# Patient Record
Sex: FEMALE | Race: WHITE | NOT HISPANIC OR LATINO | Employment: FULL TIME | URBAN - NONMETROPOLITAN AREA
[De-identification: names, ages, dates, MRNs, and addresses within clinical notes are randomized per-mention and may not be internally consistent; named-entity substitution may affect disease eponyms.]

---

## 2017-01-30 ENCOUNTER — COMMUNICATION - GICH (OUTPATIENT)
Dept: FAMILY MEDICINE | Facility: OTHER | Age: 57
End: 2017-01-30

## 2017-01-30 DIAGNOSIS — Z00.00 ENCOUNTER FOR GENERAL ADULT MEDICAL EXAMINATION WITHOUT ABNORMAL FINDINGS: ICD-10-CM

## 2017-02-16 ENCOUNTER — HISTORY (OUTPATIENT)
Dept: RADIOLOGY | Facility: OTHER | Age: 57
End: 2017-02-16

## 2017-02-16 ENCOUNTER — HOSPITAL ENCOUNTER (OUTPATIENT)
Dept: RADIOLOGY | Facility: OTHER | Age: 57
End: 2017-02-16
Attending: FAMILY MEDICINE

## 2017-02-16 DIAGNOSIS — Z12.31 ENCOUNTER FOR SCREENING MAMMOGRAM FOR MALIGNANT NEOPLASM OF BREAST: ICD-10-CM

## 2017-02-27 ENCOUNTER — OFFICE VISIT - GICH (OUTPATIENT)
Dept: FAMILY MEDICINE | Facility: OTHER | Age: 57
End: 2017-02-27

## 2017-02-27 ENCOUNTER — HISTORY (OUTPATIENT)
Dept: FAMILY MEDICINE | Facility: OTHER | Age: 57
End: 2017-02-27

## 2017-02-27 DIAGNOSIS — G43.809 OTHER MIGRAINE, NOT INTRACTABLE, WITHOUT STATUS MIGRAINOSUS: ICD-10-CM

## 2017-02-27 DIAGNOSIS — Z00.00 ENCOUNTER FOR GENERAL ADULT MEDICAL EXAMINATION WITHOUT ABNORMAL FINDINGS: ICD-10-CM

## 2017-03-03 ENCOUNTER — AMBULATORY - GICH (OUTPATIENT)
Dept: LAB | Facility: OTHER | Age: 57
End: 2017-03-03

## 2017-03-03 DIAGNOSIS — Z00.00 ENCOUNTER FOR GENERAL ADULT MEDICAL EXAMINATION WITHOUT ABNORMAL FINDINGS: ICD-10-CM

## 2017-03-03 LAB
A/G RATIO - HISTORICAL: 1.4 (ref 1–2)
ABSOLUTE BASOPHILS - HISTORICAL: 0.1 THOU/CU MM
ABSOLUTE EOSINOPHILS - HISTORICAL: 0.1 THOU/CU MM
ABSOLUTE LYMPHOCYTES - HISTORICAL: 2.6 THOU/CU MM (ref 0.9–2.9)
ABSOLUTE MONOCYTES - HISTORICAL: 0.6 THOU/CU MM
ABSOLUTE NEUTROPHILS - HISTORICAL: 2.7 THOU/CU MM (ref 1.7–7)
ALBUMIN SERPL-MCNC: 4.1 G/DL (ref 3.5–5.7)
ALP SERPL-CCNC: 55 IU/L (ref 34–104)
ALT (SGPT) - HISTORICAL: 26 IU/L (ref 7–52)
ANION GAP - HISTORICAL: 13 (ref 5–18)
AST SERPL-CCNC: 21 IU/L (ref 13–39)
BASOPHILS # BLD AUTO: 1.3 %
BILIRUB SERPL-MCNC: 0.6 MG/DL (ref 0.3–1)
BUN SERPL-MCNC: 17 MG/DL (ref 7–25)
BUN/CREAT RATIO - HISTORICAL: 16
CALCIUM SERPL-MCNC: 9.5 MG/DL (ref 8.6–10.3)
CHLORIDE SERPLBLD-SCNC: 101 MMOL/L (ref 98–107)
CHOL/HDL RATIO - HISTORICAL: 4.86
CHOLESTEROL TOTAL: 209 MG/DL
CO2 SERPL-SCNC: 27 MMOL/L (ref 21–31)
CREAT SERPL-MCNC: 1.09 MG/DL (ref 0.7–1.3)
EOSINOPHIL NFR BLD AUTO: 0.9 %
ERYTHROCYTE [DISTWIDTH] IN BLOOD BY AUTOMATED COUNT: 11.2 % (ref 11.5–15.5)
GFR IF NOT AFRICAN AMERICAN - HISTORICAL: 52 ML/MIN/1.73M2
GLOBULIN - HISTORICAL: 2.9 G/DL (ref 2–3.7)
GLUCOSE SERPL-MCNC: 98 MG/DL (ref 70–105)
HCT VFR BLD AUTO: 42.3 % (ref 33–51)
HDLC SERPL-MCNC: 43 MG/DL (ref 23–92)
HEMOGLOBIN: 14.7 G/DL (ref 12–16)
LDLC SERPL CALC-MCNC: 144 MG/DL
LYMPHOCYTES NFR BLD AUTO: 43.2 % (ref 20–44)
MCH RBC QN AUTO: 32.7 PG (ref 26–34)
MCHC RBC AUTO-ENTMCNC: 34.8 G/DL (ref 32–36)
MCV RBC AUTO: 94 FL (ref 80–100)
MONOCYTES NFR BLD AUTO: 9.4 %
NEUTROPHILS NFR BLD AUTO: 45.2 % (ref 42–72)
NON-HDL CHOLESTEROL - HISTORICAL: 166 MG/DL
PATIENT STATUS - HISTORICAL: ABNORMAL
PLATELET # BLD AUTO: 275 THOU/CU MM (ref 140–440)
PMV BLD: 5.9 FL (ref 6.5–11)
POTASSIUM SERPL-SCNC: 3.8 MMOL/L (ref 3.5–5.1)
PROT SERPL-MCNC: 7 G/DL (ref 6.4–8.9)
RED BLOOD COUNT - HISTORICAL: 4.5 MIL/CU MM (ref 4–5.2)
SODIUM SERPL-SCNC: 141 MMOL/L (ref 133–143)
TRIGL SERPL-MCNC: 109 MG/DL
TSH - HISTORICAL: 1.36 UIU/ML (ref 0.34–5.6)
WHITE BLOOD COUNT - HISTORICAL: 6 THOU/CU MM (ref 4.5–11)

## 2017-03-21 ENCOUNTER — HISTORY (OUTPATIENT)
Dept: FAMILY MEDICINE | Facility: OTHER | Age: 57
End: 2017-03-21

## 2017-03-21 ENCOUNTER — AMBULATORY - GICH (OUTPATIENT)
Dept: SCHEDULING | Facility: OTHER | Age: 57
End: 2017-03-21

## 2017-03-21 ENCOUNTER — AMBULATORY - GICH (OUTPATIENT)
Dept: FAMILY MEDICINE | Facility: OTHER | Age: 57
End: 2017-03-21

## 2017-03-21 DIAGNOSIS — Z01.818 ENCOUNTER FOR OTHER PREPROCEDURAL EXAMINATION: ICD-10-CM

## 2017-03-21 DIAGNOSIS — G43.809 OTHER MIGRAINE, NOT INTRACTABLE, WITHOUT STATUS MIGRAINOSUS: ICD-10-CM

## 2017-03-27 ENCOUNTER — AMBULATORY - GICH (OUTPATIENT)
Dept: SCHEDULING | Facility: OTHER | Age: 57
End: 2017-03-27

## 2017-05-02 ENCOUNTER — AMBULATORY - GICH (OUTPATIENT)
Dept: SCHEDULING | Facility: OTHER | Age: 57
End: 2017-05-02

## 2017-12-14 ENCOUNTER — OFFICE VISIT - GICH (OUTPATIENT)
Dept: FAMILY MEDICINE | Facility: OTHER | Age: 57
End: 2017-12-14

## 2017-12-14 ENCOUNTER — HISTORY (OUTPATIENT)
Dept: FAMILY MEDICINE | Facility: OTHER | Age: 57
End: 2017-12-14

## 2017-12-14 DIAGNOSIS — R39.9 UNSPECIFIED SYMPTOMS AND SIGNS INVOLVING THE GENITOURINARY SYSTEM: ICD-10-CM

## 2017-12-14 DIAGNOSIS — R31.9 HEMATURIA: ICD-10-CM

## 2017-12-14 DIAGNOSIS — R39.89 OTHER SYMPTOMS AND SIGNS INVOLVING THE GENITOURINARY SYSTEM: ICD-10-CM

## 2017-12-14 DIAGNOSIS — N39.0 URINARY TRACT INFECTION: ICD-10-CM

## 2017-12-14 LAB
BACTERIA URINE: ABNORMAL BACTERIA/HPF
BILIRUB UR QL: NEGATIVE
CLARITY, URINE: ABNORMAL CLARITY
COLOR UR: YELLOW COLOR
EPITHELIAL CELLS: ABNORMAL EPI/HPF
GLUCOSE URINE: NEGATIVE MG/DL
KETONES UR QL: NEGATIVE MG/DL
LEUKOCYTE ESTERASE URINE: ABNORMAL
NITRITE UR QL STRIP: POSITIVE
OCCULT BLOOD,URINE - HISTORICAL: ABNORMAL
PH UR: 5.5 [PH]
PROTEIN QUALITATIVE,URINE - HISTORICAL: 100 MG/DL
RBC - HISTORICAL: >100 /HPF
SP GR UR STRIP: >=1.03
UROBILINOGEN,QUALITATIVE - HISTORICAL: NORMAL EU/DL
WBC - HISTORICAL: >100 /HPF

## 2017-12-16 LAB
CULTURE - HISTORICAL: ABNORMAL
CULTURE - HISTORICAL: ABNORMAL
SUSCEPTIBILITY RESULT - HISTORICAL: ABNORMAL

## 2018-01-03 NOTE — TELEPHONE ENCOUNTER
Patient Information     Patient Name MRN Sex     Nicole Perera [id number] Female 1960      Telephone Encounter by Lillie Saldaña MD at 2017 12:28 PM     Author:  Lillie Saldaña MD Service:  (none) Author Type:  Physician     Filed:  2017 12:28 PM Encounter Date:  2017 Status:  Signed     :  Lillie Saldaña MD (Physician)            Orders for comprehensive metabolic profile, lipids, complete blood count and TSH placed.  Lillie Saldaña MD ....................  2017   12:28 PM

## 2018-01-03 NOTE — ADDENDUM NOTE
Patient Information     Patient Name MRN Sex     Nicole Perera [id number] Female 1960      Addendum Note by Lillie Saldaña MD at 2017 12:28 PM     Author:  Lillie Saldaña MD Service:  (none) Author Type:  Physician     Filed:  2017 12:28 PM Encounter Date:  2017 Status:  Signed     :  Lillie Saldaña MD (Physician)       Addended by: LILLIE SALDAÑA on: 2017 12:28 PM        Modules accepted: Orders

## 2018-01-03 NOTE — PROGRESS NOTES
Patient Information     Patient Name MRN Sex     Nicole Perera [id number] Female 1960      Progress Notes by Lillie Saldaña MD at 2017  1:30 PM     Author:  Lillie Saldaña MD Service:  (none) Author Type:  Physician     Filed:  2017  5:46 PM Encounter Date:  2017 Status:  Signed     :  Lillie Saldaña MD (Physician)            SUBJECTIVE:    Nicole Perera is a 56 y.o. female who presents for a physical.    Gets a headache about once a month now.  She takes inderal daily to prevent migraines and takes maxalt as an abortive agent.  She gets 2-3 other headaches a week.  She usually takes ibuprofen for her other headache, which usually works.  She does have more sinus congestion with it also, which is typical for her headaches.  She notices worsening with changes in barometric pressure.    HPI  I personally reviewed medications/allergies/history listed below:     Allergies     Allergen  Reactions     Topamax [Topiramate] Dizziness   ,   Family History       Problem   Relation Age of Onset     Heart Disease  Father 50     Stroke  Father      Other  Maternal Aunt      Migraines       Diabetes  Brother      Thyroid Disease  Mother      Thyroid disorder -after being stepped on by a cow       Diabetes  Mother      Other  Sister      CP       Alcohol/Drug  Brother 44      ETOHism-     ,   Current Outpatient Prescriptions on File Prior to Visit       Medication  Sig Dispense Refill     aspirin enteric coated 81 mg tablet Take 1 tablet by mouth once daily with a meal.  0     calcium carbonate-vitamin D3, 600 mg-400 unit, (CALCIUM WITH VITAMIN D) tablet Take 1 tablet by mouth 2 times daily with meals.  0     ibuprofen (ADVIL; MOTRIN) 200 mg tablet Take 3 tablets by mouth every 6 hours if needed for Pain.  0     multivitamin (MVI) tablet Take 1 tablet by mouth once daily.  0     omega-3 fatty acids-vitamin E (FISH OIL) 1,000 mg cap Take 1 capsule by mouth once daily.   0     No current facility-administered medications on file prior to visit.    ,   Past Medical History      Diagnosis   Date     Abnormal Pap smear       HX of abnormal Pap with atypical squamous colp  showed benign reactive epithelial changes, subsequent Paps have been normal      Exposure to bat without known bite       Bat bite, left side of neck      Hx of pregnancy             PAP SMEAR, ABNORMAL       Hx of abnormal Pap with atypical squamous colp  showed benign reactive  epithelial changes, subsequent Paps have been normal.     ,   Patient Active Problem List     Diagnosis  Code     FIBROCYSTIC BREAST DISEASE N60.19     MIGRAINE HEADACHE G43.909     Rosacea L71.9     Dyspareunia N94.1    and   Past Surgical History       Procedure   Laterality Date      section        Rt breast biopsy for fibrocystic breast disease~       Appendectomy        Breast biopsy        Esophagogastroduodenoscopy   07     Esophagogastroduodenoscopy       Colonoscopy screening   2010     Colonoscopy-  normal  Next due .       Social History     Social History        Marital status:       Spouse name: N/A     Number of children:  N/A     Years of education:  N/A     Occupational History      Not on file.     Social History Main Topics         Smoking status:  Never Smoker      Smokeless tobacco:  Never Used      Alcohol use  0.6 oz/week     1 Glasses of wine per week      Drug use:  No      Sexual activity:  Yes      Partners: Male      Birth control/ protection: Post-menopausal      Other Topics  Concern     Not on file      Social History Narrative     Does not smoke, guns at home are locked, minimal ETOH, does use sunscreen, uses seat belts 100% of the time.    .  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  Has a degree in health information management.    Arthur - son - attending [employer] in nursing.    Antonio -                 REVIEW OF SYSTEMS:  Review  "of Systems   All other systems reviewed and are negative.      OBJECTIVE:  /74  Temp 98.2  F (36.8  C) (Tympanic)  Ht 1.747 m (5' 8.8\")  Wt 102.7 kg (226 lb 6.4 oz)  Breastfeeding? No  BMI 33.63 kg/m2    EXAM:   Physical Exam   Constitutional: She is oriented to person, place, and time and well-developed, well-nourished, and in no distress.   HENT:   Head: Normocephalic.   Right Ear: External ear normal.   Left Ear: External ear normal.   Nose: Nose normal.   Mouth/Throat: Oropharynx is clear and moist.   Eyes: Pupils are equal, round, and reactive to light.   Neck: Normal range of motion. Neck supple. No thyromegaly present.   Cardiovascular: Normal rate, regular rhythm and normal heart sounds.    No murmur heard.  Pulmonary/Chest: Effort normal and breath sounds normal. No respiratory distress. She has no wheezes. She has no rales.   Breast exam: No masses palpable. No skin changes, tethering, axillary lymphadenopathy bilaterally.   Abdominal: Soft. Bowel sounds are normal. She exhibits no distension and no mass. There is no tenderness. There is no rebound and no guarding.   Genitourinary:   Genitourinary Comments: Pelvic/rectal exam deferred.   Musculoskeletal: Normal range of motion. She exhibits no edema.   Lymphadenopathy:     She has no cervical adenopathy.   Neurological: She is alert and oriented to person, place, and time. No cranial nerve deficit.   Skin: Skin is warm and dry.   Psychiatric: Affect normal.   PHQ Depression Screen  Date of PHQ exam: 02/27/17  Over the last 2 weeks, how often have you been bothered by any of the following problems?  1. Little interest or pleasure in doing things: 0 - Not at all  2. Feeling down, depressed, or hopeless: 0 - Not at all                                             ASSESSMENT/PLAN:    ICD-10-CM    1. Health care maintenance Z00.00    2. Other migraine without status migrainosus, not intractable G43.809 propranolol (INDERAL) 20 mg tablet      rizatriptan " (MAXALT-MLT) 10 mg disintegrating tablet        Plan:    1. Mammogram is up-to-date. Pap is up-to-date. Colonoscopy up-to-date. Vaccines are up-to-date. Orders for fasting labs were already placed. She is assisted in making an appointment for fasting lab appointment.  2. She feels these headaches are stable at this time. Maxalt and propranolol were refilled for her today.  Lillie Saldaña MD

## 2018-01-03 NOTE — TELEPHONE ENCOUNTER
Patient Information     Patient Name MRN Sex Nicole Zeng [id number] Female 1960      Telephone Encounter by Magdalena Chandler at 2017 11:35 AM     Author:  Magdalena Chandler Service:  (none) Author Type:  (none)     Filed:  2017 11:36 AM Encounter Date:  2017 Status:  Signed     :  Magdalena Chandler            [facility] PATIENT HAS AN APPOINTMENT 2017 FOR ANNUAL MED CHECK AND IS WANTING TO KNOW IF [facility] WOULD PUT IN AN ORDER FOR LAB WORK SO SHE COULD HAVE THAT DONE BEFORE THE APPOINTMENT. PLEASE CALL ONCE ORDER IS PLACED OR IF [facility] WOULD PREFER TO DO IT AT THE APPOINTMENT, THANKS.  Magdalena Chandler ....................  2017   11:36 AM

## 2018-01-03 NOTE — H&P
"Patient Information     Patient Name MRN Nicole Tomas [id number] Female 1960      H&P by Lillie Saldaña MD at 3/21/2017  9:30 AM     Author:  Lillie Saldaña MD Service:  (none) Author Type:  Physician     Filed:  3/21/2017 12:28 PM Encounter Date:  3/21/2017 Status:  Signed     :  Lillie Saldaña MD (Physician)            ----------------- PREOPERATIVE EXAM ------------------  3/21/2017    SUBJECTIVE:  Nicole Perera is a 56 y.o. female here for preoperative optimization.    I was asked to see Nicole Perera by Dr. Burton for  preoperative evaluation due to history of migraines    Date of Surgery: 3/27/17  Type of Surgery: Rectocele repair  Surgeon: [facility]:  Norman Rodriguez    HPI:  Has had issues with rectocele for several years. Having issues with needing to insert a finger vaginally to push posteriorly to defecate.  Gets constipated more easily as a result.      Fever/Chills or other infectious symptoms in past month:  (NO)   >10lb weight loss in past two months:  (NO)     Health Care Directive/Code status: Full Code - Has a Living Will  Hx of blood transfusions:   (NO)   History of VRE/MRSA:  (NO) Date: n/a Site: n/a    Preoperative Evaluation: Obstructive Sleep Apnea screening    S: Snore -  Do you snore loudly? (louder than talking or loud enough to be heard through closed doors)(NO)  T: Tired - Do you often feel tired, fatigued, or sleepy during the daytime?(NO)  O: Observed - Has anyone ever observed you stop breathing during your sleep?(NO)  P: Pressure - Do you have or are you being treated for high blood pressure?(NO)  B: BMI - BMI greater than 35kg/m2?(NO)  A: Age - Age over 50 years old?(YES)  N: Neck - Neck circumference greater than 40 cm?(NO)  G: Gender - Gender: Male?(NO)    Total number of \"YES\" responses:  1    Scoring: Low risk of AMY 0-2  At Risk of AMY: >3 High Risk of AMY: 5-8        Patient Active Problem List      Diagnosis " Date Noted     Rosacea 2015     Dyspareunia 2015     FIBROCYSTIC BREAST DISEASE      MIGRAINE HEADACHE        Past Medical History      Diagnosis   Date     Abnormal Pap smear       HX of abnormal Pap with atypical squamous colp  showed benign reactive epithelial changes, subsequent Paps have been normal      Exposure to bat without known bite       Bat bite, left side of neck      Hx of pregnancy             PAP SMEAR, ABNORMAL       Hx of abnormal Pap with atypical squamous colp  showed benign reactive  epithelial changes, subsequent Paps have been normal.         Past Surgical History       Procedure   Laterality Date      section        Rt breast biopsy for fibrocystic breast disease~       Appendectomy        Breast biopsy        Esophagogastroduodenoscopy   07     Esophagogastroduodenoscopy       Colonoscopy screening   2010     Colonoscopy-  normal  Next due .         Current Outpatient Prescriptions       Medication  Sig Dispense Refill     aspirin enteric coated 81 mg tablet Take 1 tablet by mouth once daily with a meal.  0     Biotin 10,000 mcg capsule Take 1 capsule by mouth once daily.       calcium carbonate-vitamin D3, 600 mg-400 unit, (CALCIUM WITH VITAMIN D) tablet Take 1 tablet by mouth 2 times daily with meals.  0     estrogens, conjugated (PREMARIN) 0.625 mg/gram vaginal cream Use small amount externally daily       ibuprofen (ADVIL; MOTRIN) 200 mg tablet Take 3 tablets by mouth every 6 hours if needed for Pain.  0     multivitamin (MVI) tablet Take 1 tablet by mouth once daily.  0     omega-3 fatty acids-vitamin E (FISH OIL) 1,000 mg cap Take 1 capsule by mouth once daily.  0     propranolol (INDERAL) 20 mg tablet Take 1 tablet by mouth once daily. May take an additional 1 tablet daily as needed for headache. 180 tablet 3     rizatriptan (MAXALT-MLT) 10 mg disintegrating tablet Max dose: 30mg per 24 hrs.TAKE 1 TABLET AS NEEDED 9 tablet 11      No current facility-administered medications for this visit.      Medications have been reviewed by me and are current to the best of my knowledge and ability.    Recent use of: no recent use of aspirin (ASA) or steroids (but used advil a week ago).  Was told by surgery office ok to continue nsaids and fish oil and no need to stop prior to surgery.  Has stopped aspirin already.     Allergies:  Allergies     Allergen  Reactions     Topamax [Topiramate] Dizziness     Latex allergy  no    Immunizations:  Immunization History     Administered  Date(s) Administered     Hepatitis B (Adult) 2000, 01/15/2001, 2001     Hepatitis B, Unspecified 2001     Herpes-zoster Vaccine 2015     Influenza Virus, Unspecified 10/19/2011     Influenza, IIV3 (Age >=3 years) 09/10/2012, 2014, 10/06/2015     Influenza, IIV4 (Age >= 3 Years) 2016     Td (Age >=7 Years) 1998     Tdap 11/10/2008       Family History       Problem   Relation Age of Onset     Heart Disease  Father 50     Stroke  Father      Other  Maternal Aunt      Migraines       Diabetes  Brother      Thyroid Disease  Mother      Thyroid disorder -after being stepped on by a cow       Diabetes  Mother      Other  Sister      CP       Alcohol/Drug  Brother 44      ETOHism-         Denies family hx of bleeding tendencies, anesthesia complications, or other problems with surgery.    Social History      Substance Use Topics        Smoking status:  Never Smoker     Smokeless tobacco:  Never Used     Alcohol use  0.6 oz/week     1 Glasses of wine per week        ROS:    Surgical:  patient denies previous complications from prior surgeries including but not limited to prolonged bleeding, anesthesia complications, dysrhythmias, surgical wound infections, or prolonged hospital stay.  Cardiorespiratory: denies chest pain, palpitations, shortness of breath, cough. Most exertion in the past 2 weeks was go on a walk for 2 miles.  Complete ROS  "otherwise negative except as noted in HPI.     -------------------------------------------------------------    PHYSICAL EXAM:  /76  Pulse 58  Temp 97.8  F (36.6  C) (Tympanic)  Ht 1.753 m (5' 9\")  Wt 103.1 kg (227 lb 6.4 oz)  SpO2 98%  Breastfeeding? No  BMI 33.58 kg/m2    EXAM:  General Appearance: Pleasant, alert, appropriate appearance for age. No acute distress  Head Exam: Normal. Normocephalic, atraumatic.  Eyes: PERRL, EOMI  Ears: Normal TM's bilaterally.   OroPharynx: Mucosa pink and moist. Dentition in good repair.  Neck: Supple, no masses or nodes, no lymphadenopathy.  No thyromegaly.  Lungs: Normal chest wall and respirations. Clear to auscultation, no wheezes or crackles.  Cardiovascular: Regular rate and rhythm. S1, S2, no murmurs.  Gastrointestinal: Soft, nontender, no abnormal masses or organomegaly. BS normal   Musculoskeletal: No edema. No warm or erythematous joints.  Skin: no concerning or new rashes.  Neurologic Exam: CN 2-12 grossly intact.  Normal gait.  Symmetric DTRs, normal gross motor movement, tone, and coordination. No tremor.  Psychiatric Exam: Alert and oriented, appropriate affect.      EKG:  Showed sinus bradycardia with a rate of 53 bpm. No ST or T-wave changes noted.    Results for orders placed or performed in visit on 03/03/17      TSH      Result  Value Ref Range    TSH 1.36 0.34 - 5.60 uIU/mL   COMP METABOLIC PANEL      Result  Value Ref Range    SODIUM 141 133 - 143 mmol/L    POTASSIUM 3.8 3.5 - 5.1 mmol/L    CHLORIDE 101 98 - 107 mmol/L    CO2,TOTAL 27 21 - 31 mmol/L    ANION GAP 13 5 - 18                    GLUCOSE 98 70 - 105 mg/dL    CALCIUM 9.5 8.6 - 10.3 mg/dL    BUN 17 7 - 25 mg/dL    CREATININE 1.09 0.70 - 1.30 mg/dL    BUN/CREAT RATIO           16                    GFR if African American >60 >60 ml/min/1.73m2    GFR if not African American 52 (L) >60 ml/min/1.73m2    ALBUMIN 4.1 3.5 - 5.7 g/dL    PROTEIN,TOTAL 7.0 6.4 - 8.9 g/dL    GLOBULIN                  2.9 " 2.0 - 3.7 g/dL    A/G RATIO 1.4 1.0 - 2.0                    BILIRUBIN,TOTAL 0.6 0.3 - 1.0 mg/dL    ALK PHOSPHATASE 55 34 - 104 IU/L    ALT (SGPT) 26 7 - 52 IU/L    AST (SGOT) 21 13 - 39 IU/L   LIPID PANEL      Result  Value Ref Range    CHOLESTEROL,TOTAL 209 (H) <200 mg/dL    TRIGLYCERIDES 109 <150 mg/dL    HDL CHOLESTEROL 43 23 - 92 mg/dL    NON-HDL CHOLESTEROL 166 (H) <145 mg/dl    CHOL/HDL RATIO            4.86 (H) <4.50                    LDL CHOLESTEROL 144 (H) <100 mg/dL    PATIENT STATUS            FASTING                   CBC WITH AUTO DIFFERENTIAL      Result  Value Ref Range    WHITE BLOOD COUNT         6.0 4.5 - 11.0 thou/cu mm    RED BLOOD COUNT           4.50 4.00 - 5.20 mil/cu mm    HEMOGLOBIN                14.7 12.0 - 16.0 g/dL    HEMATOCRIT                42.3 33.0 - 51.0 %    MCV                       94 80 - 100 fL    MCH                       32.7 26.0 - 34.0 pg    MCHC                      34.8 32.0 - 36.0 g/dL    RDW                       11.2 (L) 11.5 - 15.5 %    PLATELET COUNT            275 140 - 440 thou/cu mm    MPV                       5.9 (L) 6.5 - 11.0 fL    NEUTROPHILS               45.2 42.0 - 72.0 %    LYMPHOCYTES               43.2 20.0 - 44.0 %    MONOCYTES                 9.4 <12.0 %    EOSINOPHILS               0.9 <8.0 %    BASOPHILS                 1.3 <3.0 %    ABSOLUTE NEUTROPHILS      2.7 1.7 - 7.0 thou/cu mm    ABSOLUTE LYMPHOCYTES      2.6 0.9 - 2.9 thou/cu mm    ABSOLUTE MONOCYTES        0.6 <0.9 thou/cu mm    ABSOLUTE EOSINOPHILS      0.1 <0.5 thou/cu mm    ABSOLUTE BASOPHILS        0.1 <0.3 thou/cu mm      ---------------------------------------------------------------    ASSESSEMENT AND PLAN:    There are no diagnoses linked to this encounter.    PRE OP RECOMMENDATIONS:    No family history of problems with bleeding or anesthesia. Patient is able to tolerate greater than 4 METs of activity without any cardiopulmonary symptoms. ASA PS class 2 . No cardiopulmonary  workup is neccessary for the current procedure. Please contact the office with any questions or concerns.    Patient is on chronic pain medications (NO);   Patient is on antiplatlet/anticoagulation (YES) aspirin only.  Other medications that need adjustment perioperatively (NO)    Other:  Patient was advised to call our office and the surgical services with any change in condition or new symptoms if they were to develop between today and their surgical date; especially any cardiopulmonary symptoms or symptoms concerning for an infection.    She is artery stopped aspirin as noted above.    Lillie Saldaña MD

## 2018-01-04 NOTE — PROGRESS NOTES
"Patient Information     Patient Name MRN Sex Nicole Zeng [id number] Female 1960      Progress Notes by Lillie Saldaña MD at 3/21/2017  9:30 AM     Author:  Lillie Saldaña MD Service:  (none) Author Type:  Physician     Filed:  3/21/2017 12:28 PM Encounter Date:  3/21/2017 Status:  Signed     :  Lillie Saldaña MD (Physician)            ----------------- PREOPERATIVE EXAM ------------------  3/21/2017    SUBJECTIVE:  Nicole Perera is a 56 y.o. female here for preoperative optimization.    I was asked to see Nicole Perera by Dr. Burton for  preoperative evaluation due to history of migraines    Date of Surgery: 3/27/17  Type of Surgery: Rectocele repair  Surgeon: [facility]:  Norman Rodriguez    HPI:  Has had issues with rectocele for several years. Having issues with needing to insert a finger vaginally to push posteriorly to defecate.  Gets constipated more easily as a result.      Fever/Chills or other infectious symptoms in past month:  (NO)   >10lb weight loss in past two months:  (NO)     Health Care Directive/Code status: Full Code - Has a Living Will  Hx of blood transfusions:   (NO)   History of VRE/MRSA:  (NO) Date: n/a Site: n/a    Preoperative Evaluation: Obstructive Sleep Apnea screening    S: Snore -  Do you snore loudly? (louder than talking or loud enough to be heard through closed doors)(NO)  T: Tired - Do you often feel tired, fatigued, or sleepy during the daytime?(NO)  O: Observed - Has anyone ever observed you stop breathing during your sleep?(NO)  P: Pressure - Do you have or are you being treated for high blood pressure?(NO)  B: BMI - BMI greater than 35kg/m2?(NO)  A: Age - Age over 50 years old?(YES)  N: Neck - Neck circumference greater than 40 cm?(NO)  G: Gender - Gender: Male?(NO)    Total number of \"YES\" responses:  1    Scoring: Low risk of AMY 0-2  At Risk of AMY: >3 High Risk of AMY: 5-8        Patient Active Problem List      " Diagnosis Date Noted     Rosacea 2015     Dyspareunia 2015     FIBROCYSTIC BREAST DISEASE      MIGRAINE HEADACHE        Past Medical History      Diagnosis   Date     Abnormal Pap smear       HX of abnormal Pap with atypical squamous colp  showed benign reactive epithelial changes, subsequent Paps have been normal      Exposure to bat without known bite       Bat bite, left side of neck      Hx of pregnancy             PAP SMEAR, ABNORMAL       Hx of abnormal Pap with atypical squamous colp  showed benign reactive  epithelial changes, subsequent Paps have been normal.         Past Surgical History       Procedure   Laterality Date      section        Rt breast biopsy for fibrocystic breast disease~       Appendectomy        Breast biopsy        Esophagogastroduodenoscopy   07     Esophagogastroduodenoscopy       Colonoscopy screening   2010     Colonoscopy-  normal  Next due .         Current Outpatient Prescriptions       Medication  Sig Dispense Refill     aspirin enteric coated 81 mg tablet Take 1 tablet by mouth once daily with a meal.  0     Biotin 10,000 mcg capsule Take 1 capsule by mouth once daily.       calcium carbonate-vitamin D3, 600 mg-400 unit, (CALCIUM WITH VITAMIN D) tablet Take 1 tablet by mouth 2 times daily with meals.  0     estrogens, conjugated (PREMARIN) 0.625 mg/gram vaginal cream Use small amount externally daily       ibuprofen (ADVIL; MOTRIN) 200 mg tablet Take 3 tablets by mouth every 6 hours if needed for Pain.  0     multivitamin (MVI) tablet Take 1 tablet by mouth once daily.  0     omega-3 fatty acids-vitamin E (FISH OIL) 1,000 mg cap Take 1 capsule by mouth once daily.  0     propranolol (INDERAL) 20 mg tablet Take 1 tablet by mouth once daily. May take an additional 1 tablet daily as needed for headache. 180 tablet 3     rizatriptan (MAXALT-MLT) 10 mg disintegrating tablet Max dose: 30mg per 24 hrs.TAKE 1 TABLET AS NEEDED 9  tablet 11     No current facility-administered medications for this visit.      Medications have been reviewed by me and are current to the best of my knowledge and ability.    Recent use of: no recent use of aspirin (ASA) or steroids (but used advil a week ago).  Was told by surgery office ok to continue nsaids and fish oil and no need to stop prior to surgery.  Has stopped aspirin already.     Allergies:  Allergies     Allergen  Reactions     Topamax [Topiramate] Dizziness     Latex allergy  no    Immunizations:  Immunization History     Administered  Date(s) Administered     Hepatitis B (Adult) 2000, 01/15/2001, 2001     Hepatitis B, Unspecified 2001     Herpes-zoster Vaccine 2015     Influenza Virus, Unspecified 10/19/2011     Influenza, IIV3 (Age >=3 years) 09/10/2012, 2014, 10/06/2015     Influenza, IIV4 (Age >= 3 Years) 2016     Td (Age >=7 Years) 1998     Tdap 11/10/2008       Family History       Problem   Relation Age of Onset     Heart Disease  Father 50     Stroke  Father      Other  Maternal Aunt      Migraines       Diabetes  Brother      Thyroid Disease  Mother      Thyroid disorder -after being stepped on by a cow       Diabetes  Mother      Other  Sister      CP       Alcohol/Drug  Brother 44      ETOHism-         Denies family hx of bleeding tendencies, anesthesia complications, or other problems with surgery.    Social History      Substance Use Topics        Smoking status:  Never Smoker     Smokeless tobacco:  Never Used     Alcohol use  0.6 oz/week     1 Glasses of wine per week        ROS:    Surgical:  patient denies previous complications from prior surgeries including but not limited to prolonged bleeding, anesthesia complications, dysrhythmias, surgical wound infections, or prolonged hospital stay.  Cardiorespiratory: denies chest pain, palpitations, shortness of breath, cough. Most exertion in the past 2 weeks was go on a walk for 2  "shayna.  Complete ROS otherwise negative except as noted in HPI.     -------------------------------------------------------------    PHYSICAL EXAM:  /76  Pulse 58  Temp 97.8  F (36.6  C) (Tympanic)  Ht 1.753 m (5' 9\")  Wt 103.1 kg (227 lb 6.4 oz)  SpO2 98%  Breastfeeding? No  BMI 33.58 kg/m2    EXAM:  General Appearance: Pleasant, alert, appropriate appearance for age. No acute distress  Head Exam: Normal. Normocephalic, atraumatic.  Eyes: PERRL, EOMI  Ears: Normal TM's bilaterally.   OroPharynx: Mucosa pink and moist. Dentition in good repair.  Neck: Supple, no masses or nodes, no lymphadenopathy.  No thyromegaly.  Lungs: Normal chest wall and respirations. Clear to auscultation, no wheezes or crackles.  Cardiovascular: Regular rate and rhythm. S1, S2, no murmurs.  Gastrointestinal: Soft, nontender, no abnormal masses or organomegaly. BS normal   Musculoskeletal: No edema. No warm or erythematous joints.  Skin: no concerning or new rashes.  Neurologic Exam: CN 2-12 grossly intact.  Normal gait.  Symmetric DTRs, normal gross motor movement, tone, and coordination. No tremor.  Psychiatric Exam: Alert and oriented, appropriate affect.      EKG:  Showed sinus bradycardia with a rate of 53 bpm. No ST or T-wave changes noted.    Results for orders placed or performed in visit on 03/03/17      TSH      Result  Value Ref Range    TSH 1.36 0.34 - 5.60 uIU/mL   COMP METABOLIC PANEL      Result  Value Ref Range    SODIUM 141 133 - 143 mmol/L    POTASSIUM 3.8 3.5 - 5.1 mmol/L    CHLORIDE 101 98 - 107 mmol/L    CO2,TOTAL 27 21 - 31 mmol/L    ANION GAP 13 5 - 18                    GLUCOSE 98 70 - 105 mg/dL    CALCIUM 9.5 8.6 - 10.3 mg/dL    BUN 17 7 - 25 mg/dL    CREATININE 1.09 0.70 - 1.30 mg/dL    BUN/CREAT RATIO           16                    GFR if African American >60 >60 ml/min/1.73m2    GFR if not African American 52 (L) >60 ml/min/1.73m2    ALBUMIN 4.1 3.5 - 5.7 g/dL    PROTEIN,TOTAL 7.0 6.4 - 8.9 g/dL    GLOBULIN "                  2.9 2.0 - 3.7 g/dL    A/G RATIO 1.4 1.0 - 2.0                    BILIRUBIN,TOTAL 0.6 0.3 - 1.0 mg/dL    ALK PHOSPHATASE 55 34 - 104 IU/L    ALT (SGPT) 26 7 - 52 IU/L    AST (SGOT) 21 13 - 39 IU/L   LIPID PANEL      Result  Value Ref Range    CHOLESTEROL,TOTAL 209 (H) <200 mg/dL    TRIGLYCERIDES 109 <150 mg/dL    HDL CHOLESTEROL 43 23 - 92 mg/dL    NON-HDL CHOLESTEROL 166 (H) <145 mg/dl    CHOL/HDL RATIO            4.86 (H) <4.50                    LDL CHOLESTEROL 144 (H) <100 mg/dL    PATIENT STATUS            FASTING                   CBC WITH AUTO DIFFERENTIAL      Result  Value Ref Range    WHITE BLOOD COUNT         6.0 4.5 - 11.0 thou/cu mm    RED BLOOD COUNT           4.50 4.00 - 5.20 mil/cu mm    HEMOGLOBIN                14.7 12.0 - 16.0 g/dL    HEMATOCRIT                42.3 33.0 - 51.0 %    MCV                       94 80 - 100 fL    MCH                       32.7 26.0 - 34.0 pg    MCHC                      34.8 32.0 - 36.0 g/dL    RDW                       11.2 (L) 11.5 - 15.5 %    PLATELET COUNT            275 140 - 440 thou/cu mm    MPV                       5.9 (L) 6.5 - 11.0 fL    NEUTROPHILS               45.2 42.0 - 72.0 %    LYMPHOCYTES               43.2 20.0 - 44.0 %    MONOCYTES                 9.4 <12.0 %    EOSINOPHILS               0.9 <8.0 %    BASOPHILS                 1.3 <3.0 %    ABSOLUTE NEUTROPHILS      2.7 1.7 - 7.0 thou/cu mm    ABSOLUTE LYMPHOCYTES      2.6 0.9 - 2.9 thou/cu mm    ABSOLUTE MONOCYTES        0.6 <0.9 thou/cu mm    ABSOLUTE EOSINOPHILS      0.1 <0.5 thou/cu mm    ABSOLUTE BASOPHILS        0.1 <0.3 thou/cu mm      ---------------------------------------------------------------    ASSESSEMENT AND PLAN:    There are no diagnoses linked to this encounter.    PRE OP RECOMMENDATIONS:    No family history of problems with bleeding or anesthesia. Patient is able to tolerate greater than 4 METs of activity without any cardiopulmonary symptoms. ASA PS class 2 .  No cardiopulmonary workup is neccessary for the current procedure. Please contact the office with any questions or concerns.    Patient is on chronic pain medications (NO);   Patient is on antiplatlet/anticoagulation (YES) aspirin only.  Other medications that need adjustment perioperatively (NO)    Other:  Patient was advised to call our office and the surgical services with any change in condition or new symptoms if they were to develop between today and their surgical date; especially any cardiopulmonary symptoms or symptoms concerning for an infection.    She is artery stopped aspirin as noted above.    Lillie Saldaña MD

## 2018-01-26 VITALS
TEMPERATURE: 98.2 F | HEIGHT: 69 IN | WEIGHT: 226.4 LBS | DIASTOLIC BLOOD PRESSURE: 74 MMHG | BODY MASS INDEX: 33.53 KG/M2 | SYSTOLIC BLOOD PRESSURE: 108 MMHG

## 2018-01-26 VITALS
HEART RATE: 58 BPM | BODY MASS INDEX: 33.68 KG/M2 | SYSTOLIC BLOOD PRESSURE: 120 MMHG | OXYGEN SATURATION: 98 % | HEIGHT: 69 IN | WEIGHT: 227.4 LBS | TEMPERATURE: 97.8 F | DIASTOLIC BLOOD PRESSURE: 76 MMHG

## 2018-02-09 VITALS
SYSTOLIC BLOOD PRESSURE: 110 MMHG | HEIGHT: 68 IN | TEMPERATURE: 97.4 F | WEIGHT: 234.4 LBS | DIASTOLIC BLOOD PRESSURE: 64 MMHG | BODY MASS INDEX: 35.52 KG/M2 | HEART RATE: 77 BPM

## 2018-02-12 NOTE — NURSING NOTE
Patient Information     Patient Name MRN Sex Nicole Zeng [id number] Female 1960      Nursing Note by Rose Maire Myers at 2017  7:00 PM     Author:  Rose Marie Myers Service:  (none) Author Type:  NURS- Student Practical Nurse     Filed:  2017  8:03 PM Encounter Date:  2017 Status:  Signed     :  Rose Marie Myers (NURS- Student Practical Nurse)            Patient presents with frequency, urgency, dysuria for 3-4 days. Rose Marie Myers LPN .............2017  7:32 PM

## 2018-02-12 NOTE — PROGRESS NOTES
Patient Information     Patient Name MRN Sex     Nicole Perera [id number] Female 1960      Progress Notes by Kassidy Page NP at 2017  7:00 PM     Author:  Kassidy Page NP Service:  (none) Author Type:  PHYS- Nurse Practitioner     Filed:  2017  9:12 PM Encounter Date:  2017 Status:  Signed     :  Kassidy Page NP (PHYS- Nurse Practitioner)            Nursing Notes:   Rose Marie Myers  2017  8:03 PM  Signed  Patient presents with frequency, urgency, dysuria for 3-4 days. Rose Marie Myers LPN .............2017  7:32 PM      HPI:   Nicole Perera is a 57 y.o. female who presents for bladder concerns.   Urinary frequency and urgency for the past 3-4 days.  Dysuria started this evening.  No blood in urine.  No fevers or chills.  No nausea or vomiting.  Appetite normal.  No constipation or diarrhea, more frequent stools from increased fiber in diet.  No abdominal pain.  No back pain.  Feeling fatigued.  Some vaginal itching today.  No vaginal discharge.  No OTC treatments.          Past Medical History:     Diagnosis  Date     Abnormal Pap smear     HX of abnormal Pap with atypical squamous colp  showed benign reactive epithelial changes, subsequent Paps have been normal      Exposure to bat without known bite     Bat bite, left side of neck      Hx of pregnancy           PAP SMEAR, ABNORMAL     Hx of abnormal Pap with atypical squamous colp  showed benign reactive  epithelial changes, subsequent Paps have been normal.         Past Surgical History:      Procedure  Laterality Date     APPENDECTOMY       BREAST BIOPSY        SECTION      Rt breast biopsy for fibrocystic breast disease~       COLONOSCOPY SCREENING  2010    Colonoscopy-  normal  Next due .       ESOPHAGOGASTRODUODENOSCOPY  07    Esophagogastroduodenoscopy         Social History      Substance Use Topics        Smoking status:  Never Smoker  "    Smokeless tobacco:  Never Used     Alcohol use  0.6 oz/week      1 Glasses of wine per week         Current Outpatient Prescriptions       Medication  Sig Dispense Refill     aspirin enteric coated 81 mg tablet Take 1 tablet by mouth once daily with a meal.  0     calcium carbonate-vitamin D3, 600 mg-400 unit, (CALCIUM WITH VITAMIN D) tablet Take 1 tablet by mouth 2 times daily with meals.  0     estrogens, conjugated (PREMARIN) 0.625 mg/gram vaginal cream Use small amount externally daily       ibuprofen (ADVIL; MOTRIN) 200 mg tablet Take 3 tablets by mouth every 6 hours if needed for Pain.  0     multivitamin (MVI) tablet Take 1 tablet by mouth once daily.  0     omega-3 fatty acids-vitamin E (FISH OIL) 1,000 mg cap Take 1 capsule by mouth once daily.  0     propranolol (INDERAL) 20 mg tablet Take 1 tablet by mouth once daily. May take an additional 1 tablet daily as needed for headache. 180 tablet 3     rizatriptan (MAXALT-MLT) 10 mg disintegrating tablet Max dose: 30mg per 24 hrs.TAKE 1 TABLET AS NEEDED 9 tablet 11     No current facility-administered medications for this visit.      Medications have been reviewed by me and are current to the best of my knowledge and ability.      Allergies     Allergen  Reactions     Topamax [Topiramate] Dizziness       REVIEW OF SYSTEMS:  Refer to HPI.      EXAM:   Vitals:    /64 (Cuff Site: Left Arm, Position: Sitting, Cuff Size: Adult Regular)  Pulse 77  Temp 97.4  F (36.3  C) (Tympanic)  Ht 1.73 m (5' 8.11\")  Wt 106.3 kg (234 lb 6.4 oz)  Breastfeeding? No  BMI 35.53 kg/m2    General Appearance: Pleasant, alert, appropriate appearance for age. No acute distress  Chest/Respiratory Exam: Normal chest wall and respirations. Clear to auscultation.  Cardiovascular Exam: Regular rate and rhythm. S1, S2, no murmur  Abdomen: soft, nontender, no masses or organomegally, no rebound tenderness or guarding, normal bowel sounds present  :  No suprapubic tenderness to " palpation.  No CVA tenderness to palpation.    Musculoskeletal:  Normal gait.  Equal movement of bilateral upper extremities.  Equal movement of bilateral lower extremities.    Dermatological: no rashes noted of exposed skin  Psychiatric Exam: Alert and oriented - appropriate affect.      Labs:   Results for orders placed or performed in visit on 12/14/17      URINALYSIS W REFLEX MICROSCOPIC IF POSITIVE      Result  Value Ref Range    COLOR                     Yellow Yellow Color    CLARITY                   Slightly Cloudy (A) Clear Clarity    SPECIFIC GRAVITY,URINE    >=1.030 (A) 1.010, 1.015, 1.020, 1.025                    PH,URINE                  5.5 6.0, 7.0, 8.0, 5.5, 6.5, 7.5, 8.5                    UROBILINOGEN,QUALITATIVE  Normal Normal EU/dl    PROTEIN, URINE 100 (A) Negative mg/dL    GLUCOSE, URINE Negative Negative mg/dL    KETONES,URINE             Negative Negative mg/dL    BILIRUBIN,URINE           Negative Negative                    OCCULT BLOOD,URINE        Large (A) Negative                    NITRITE                   Positive (A) Negative                    LEUKOCYTE ESTERASE        Large (A) Negative                   URINALYSIS MICROSCOPIC      Result  Value Ref Range    RBC >100 (A) 0-2, None Seen /HPF    WBC >100 (A) 0-2, 3-5, None Seen /HPF    BACTERIA                  Many (A) None Seen, Rare, Occasional, Few Bacteria/HPF    EPITHELIAL CELLS          Few None Seen, Few Epi/HPF         ASSESSMENT AND PLAN:      ICD-10-CM    1. Urinary tract infection with hematuria, site unspecified N39.0 URINE CULTURE     R31.9 nitrofurantoin macrocrystals/monohydrate (MACROBID) 100 mg capsule      URINE CULTURE   2. Urinary problem R39.89 URINALYSIS W REFLEX MICROSCOPIC IF POSITIVE      URINALYSIS W REFLEX MICROSCOPIC IF POSITIVE      URINALYSIS MICROSCOPIC      URINALYSIS MICROSCOPIC   3. UTI symptoms R39.9 URINE CULTURE      URINE CULTURE         Urinalysis -  Positive nitrite, many bacteria, many  WBCs and RBCs  Urine culture pending  Macrobid 100 mg BID x 5 days   Encouraged fluids and frequent bladder emptying.  May use Pyridium OTC PRN.   Call or return to clinic PRN if these symptoms worsen or fail to improve as anticipated. Will call if culture warrants change of abx.         Patient Instructions   Antibiotic has been sent to pharmacy. Please take full course of antibiotic even if symptoms have completely resolved. This helps prevent against antibiotic resistance.     We will culture the urine to see what bacteria grows out of the urine.  We will call the patient if a change of antibiotic is necessary per the culture.      Patient was instructed in increase fluids including water and cranberry juice.      AZO for burning    Return to clinic if symptoms are not resolved.  Call clinic if symptoms change/worsen.           Index Malagasy   Urinary Tract Infection in Women   ________________________________________________________________________  KEY POINTS    A urinary tract infection is an infection of your kidneys, ureters, bladder, or urethra.    Your healthcare provider will likely prescribe an antibiotic and medicine to help relieve burning and discomfort.    Follow the full course of treatment prescribed by your healthcare provider. If you were prescribed an antibiotic, take all of it as prescribed, even if your symptoms are gone.  ________________________________________________________________________  What is a urinary tract infection?  Urinary tract infection (UTI) is an infection of one or more parts of the urinary tract. The urinary tract includes your:    Kidneys, which make urine    Ureters, which are the tubes that carry urine from the kidneys to the bladder    Bladder, which stores urine    Urethra, which is the tube that drains urine from the bladder  What is the cause?  Urinary tract infection is usually caused by bacteria. Normally the urinary tract does not have any bacteria or other  organisms in it. Bacteria that cause a UTI often spread from the rectum or vagina to the urethra and then to the bladder or kidneys. Urinary tract infection is common in women because the urethra is short. This makes it easier for bacteria to move up to the bladder. Sometimes bacteria spread from another part of the body through the bloodstream to the urinary tract.  Some of the things that can lead to an infection are:    A blockage in the urinary tract, such as a kidney stone    A sexually transmitted disease or infection (also called an STD or STI)    Getting older, when it may get harder to empty and flush out the bladder completely    Having medical problems such as diabetes, a problem with the immune system, sickle cell anemia, stroke, kidney stones, or any illness or disability that makes it hard to empty your bladder completely    Use of a catheter to drain the bladder    Scarring in the urinary tract from previous infections or surgery  You are more likely to have an infection if:    You just started having sex or have a new sex partner    You are past menopause    You are pregnant  What are the symptoms?  Symptoms may include:    Urinating more often    Feeling an urgent need to urinate or feeling that your bladder is always full    Pain or burning when you urinate    Pain in your lower belly, low back, or your side    Urine that smells bad    Urine that looks cloudy, reddish, or bloody    Fever and chills or sweating    Nausea and vomiting    Leaking of urine    Pain during sex  How is it diagnosed?  Your healthcare provider will ask about your symptoms and medical history and examine you. Tests to diagnose a simple urinary tract infection may include:    Urine tests    Blood tests  If you are having more serious symptoms or frequent infections, you may need one or more tests:    An intravenous pyelogram (IVP), which is a series of X-rays taken after your healthcare provider injects contrast dye into your  blood vessels to look for blockages in your kidneys and urinary tract    An ultrasound, which uses sound waves to show pictures of the kidneys and urinary tract    A pelvic exam    A cystoscopy, which uses a slim, flexible, lighted tube passed through your urethra into your bladder, and usually done by a specialist called a urologist  How is it treated?  Your healthcare provider will most likely prescribe an antibiotic and medicine to help relieve burning and discomfort. Prompt treatment of a UTI usually relieves the symptoms in 1 to 2 days. If your infection has been causing symptoms for several days before treatment or if you have a fever, it may take longer to feel better.  It s important to get prompt treatment for a UTI. If the infection is not treated, it could damage your kidneys and make you very sick. If the infection spreads to your blood, it can be life-threatening. If you are very sick, you may need to be in the hospital and get antibiotics by IV.  How can I take care of myself?  Follow the full course of treatment prescribed by your healthcare provider. If you were prescribed an antibiotic medicine, take the antibiotics for as long as your healthcare provider prescribes, even if you feel better. If you stop taking the medicine too soon, you may not kill all of the bacteria and you may get sick again. If you have side effects from your medicine, talk to your healthcare provider.  Ask your provider:    How and when you will get your test results    How long it will take to recover    If there are activities you should avoid and when you can return to your normal activities    How to take care of yourself at home    What symptoms or problems you should watch for and what to do if you have them    Make sure you know when you should come back for a checkup.    Drink plenty of water each day to cleanse your bladder and urinary tract unless your healthcare provider has told you to limit how much fluid you  drink.    Soaking in a tub of warm water for 20 to 30 minutes may help relieve pain.  How can I help prevent urinary tract infection?  You can help prevent UTIs if you:    Drink enough liquids to keep your urine light yellow in color.    Drink a glass of cranberry juice each day. The juice should be real cranberry juice, not a cranberry-flavored drink.    Don t wait to go to the bathroom if you feel the need to urinate.    Practice safe sex:    Ask your healthcare provider which type of condom, diaphragm, or other birth control is right for you.    Urinate soon after sex.    Keep your genital area clean. If you want to have vaginal sex after anal sex, both partners should wash their genitals first.    Empty your bladder completely when you urinate.    Don t wear a wet bathing suit for long periods of time.    Don t use irritating cosmetics or chemicals in your genital area. This includes, for example, strong soaps, feminine hygiene sprays, douches, scented tampons, sanitary napkins, or panty liners.    Keep your vaginal area clean. Wiping from front to back after using the toilet may help prevent infections. Use mild, unscented soap to wash your genital area gently each time you bathe or shower.    Wear underwear that is all cotton or has a cotton crotch. Pantyhose should also have a cotton crotch. Cotton absorbs moisture better than nylon. Change underwear and pantyhose every day.    During pregnancy, tell your healthcare provider if you often have urinary tract problems.  If you have reached menopause and are not taking estrogen, prescription estrogen vaginal cream may help prevent bladder infections.  Developed by [employer].  Adult Advisor 2017.2 published by [employer].  Last modified: 2016-04-27  Last reviewed: 2016-04-26  This content is reviewed periodically and is subject to change as new health information becomes available. The information is intended to inform and educate and is not a replacement for  medical evaluation, advice, diagnosis or treatment by a healthcare professional.  References   Adult Advisor 2017.2 Index    Copyright   2017 [employer], a division of [employer] All rights reserved.               MARCO ANTONIO FONSECA NP..................12/14/2017 8:03 PM

## 2018-02-12 NOTE — PATIENT INSTRUCTIONS
Patient Information     Patient Name MRN Nicole Tomas [id number] Female 1960      Patient Instructions by Kassidy Page NP at 2017  8:12 PM     Author:  Kassidy Page NP  Service:  (none) Author Type:  PHYS- Nurse Practitioner     Filed:  2017  8:12 PM  Encounter Date:  2017 Status:  Addendum     :  Kassidy Page NP (PHYS- Nurse Practitioner)        Related Notes: Original Note by Kassidy Page NP (PHYS- Nurse Practitioner) filed at 2017  8:12 PM            Antibiotic has been sent to pharmacy. Please take full course of antibiotic even if symptoms have completely resolved. This helps prevent against antibiotic resistance.     We will culture the urine to see what bacteria grows out of the urine.  We will call the patient if a change of antibiotic is necessary per the culture.      Patient was instructed in increase fluids including water and cranberry juice.      AZO for burning    Return to clinic if symptoms are not resolved.  Call clinic if symptoms change/worsen.           Index Serbian   Urinary Tract Infection in Women   ________________________________________________________________________  KEY POINTS    A urinary tract infection is an infection of your kidneys, ureters, bladder, or urethra.    Your healthcare provider will likely prescribe an antibiotic and medicine to help relieve burning and discomfort.    Follow the full course of treatment prescribed by your healthcare provider. If you were prescribed an antibiotic, take all of it as prescribed, even if your symptoms are gone.  ________________________________________________________________________  What is a urinary tract infection?  Urinary tract infection (UTI) is an infection of one or more parts of the urinary tract. The urinary tract includes your:    Kidneys, which make urine    Ureters, which are the tubes that carry urine from the kidneys to the bladder    Bladder,  which stores urine    Urethra, which is the tube that drains urine from the bladder  What is the cause?  Urinary tract infection is usually caused by bacteria. Normally the urinary tract does not have any bacteria or other organisms in it. Bacteria that cause a UTI often spread from the rectum or vagina to the urethra and then to the bladder or kidneys. Urinary tract infection is common in women because the urethra is short. This makes it easier for bacteria to move up to the bladder. Sometimes bacteria spread from another part of the body through the bloodstream to the urinary tract.  Some of the things that can lead to an infection are:    A blockage in the urinary tract, such as a kidney stone    A sexually transmitted disease or infection (also called an STD or STI)    Getting older, when it may get harder to empty and flush out the bladder completely    Having medical problems such as diabetes, a problem with the immune system, sickle cell anemia, stroke, kidney stones, or any illness or disability that makes it hard to empty your bladder completely    Use of a catheter to drain the bladder    Scarring in the urinary tract from previous infections or surgery  You are more likely to have an infection if:    You just started having sex or have a new sex partner    You are past menopause    You are pregnant  What are the symptoms?  Symptoms may include:    Urinating more often    Feeling an urgent need to urinate or feeling that your bladder is always full    Pain or burning when you urinate    Pain in your lower belly, low back, or your side    Urine that smells bad    Urine that looks cloudy, reddish, or bloody    Fever and chills or sweating    Nausea and vomiting    Leaking of urine    Pain during sex  How is it diagnosed?  Your healthcare provider will ask about your symptoms and medical history and examine you. Tests to diagnose a simple urinary tract infection may include:    Urine tests    Blood tests  If  you are having more serious symptoms or frequent infections, you may need one or more tests:    An intravenous pyelogram (IVP), which is a series of X-rays taken after your healthcare provider injects contrast dye into your blood vessels to look for blockages in your kidneys and urinary tract    An ultrasound, which uses sound waves to show pictures of the kidneys and urinary tract    A pelvic exam    A cystoscopy, which uses a slim, flexible, lighted tube passed through your urethra into your bladder, and usually done by a specialist called a urologist  How is it treated?  Your healthcare provider will most likely prescribe an antibiotic and medicine to help relieve burning and discomfort. Prompt treatment of a UTI usually relieves the symptoms in 1 to 2 days. If your infection has been causing symptoms for several days before treatment or if you have a fever, it may take longer to feel better.  It s important to get prompt treatment for a UTI. If the infection is not treated, it could damage your kidneys and make you very sick. If the infection spreads to your blood, it can be life-threatening. If you are very sick, you may need to be in the hospital and get antibiotics by IV.  How can I take care of myself?  Follow the full course of treatment prescribed by your healthcare provider. If you were prescribed an antibiotic medicine, take the antibiotics for as long as your healthcare provider prescribes, even if you feel better. If you stop taking the medicine too soon, you may not kill all of the bacteria and you may get sick again. If you have side effects from your medicine, talk to your healthcare provider.  Ask your provider:    How and when you will get your test results    How long it will take to recover    If there are activities you should avoid and when you can return to your normal activities    How to take care of yourself at home    What symptoms or problems you should watch for and what to do if you  have them    Make sure you know when you should come back for a checkup.    Drink plenty of water each day to cleanse your bladder and urinary tract unless your healthcare provider has told you to limit how much fluid you drink.    Soaking in a tub of warm water for 20 to 30 minutes may help relieve pain.  How can I help prevent urinary tract infection?  You can help prevent UTIs if you:    Drink enough liquids to keep your urine light yellow in color.    Drink a glass of cranberry juice each day. The juice should be real cranberry juice, not a cranberry-flavored drink.    Don t wait to go to the bathroom if you feel the need to urinate.    Practice safe sex:    Ask your healthcare provider which type of condom, diaphragm, or other birth control is right for you.    Urinate soon after sex.    Keep your genital area clean. If you want to have vaginal sex after anal sex, both partners should wash their genitals first.    Empty your bladder completely when you urinate.    Don t wear a wet bathing suit for long periods of time.    Don t use irritating cosmetics or chemicals in your genital area. This includes, for example, strong soaps, feminine hygiene sprays, douches, scented tampons, sanitary napkins, or panty liners.    Keep your vaginal area clean. Wiping from front to back after using the toilet may help prevent infections. Use mild, unscented soap to wash your genital area gently each time you bathe or shower.    Wear underwear that is all cotton or has a cotton crotch. Pantyhose should also have a cotton crotch. Cotton absorbs moisture better than nylon. Change underwear and pantyhose every day.    During pregnancy, tell your healthcare provider if you often have urinary tract problems.  If you have reached menopause and are not taking estrogen, prescription estrogen vaginal cream may help prevent bladder infections.  Developed by [employer].  Adult Advisor 2017.2 published by [employer].  Last modified:  2016-04-27  Last reviewed: 2016-04-26  This content is reviewed periodically and is subject to change as new health information becomes available. The information is intended to inform and educate and is not a replacement for medical evaluation, advice, diagnosis or treatment by a healthcare professional.  References   Adult Advisor 2017.2 Index    Copyright   2017 [employer], a division of [employer] All rights reserved.

## 2018-02-13 ENCOUNTER — DOCUMENTATION ONLY (OUTPATIENT)
Dept: FAMILY MEDICINE | Facility: OTHER | Age: 58
End: 2018-02-13

## 2018-02-13 PROBLEM — G43.909 MIGRAINE HEADACHE: Status: ACTIVE | Noted: 2018-02-13

## 2018-02-13 PROBLEM — N60.19 FIBROCYSTIC BREAST DISEASE: Status: ACTIVE | Noted: 2018-02-13

## 2018-02-13 RX ORDER — CHLORAL HYDRATE 500 MG
1 CAPSULE ORAL DAILY
COMMUNITY
Start: 2013-01-10

## 2018-02-13 RX ORDER — ASPIRIN 81 MG/1
81 TABLET ORAL DAILY
COMMUNITY
Start: 2013-01-10

## 2018-02-13 RX ORDER — RIZATRIPTAN BENZOATE 10 MG/1
1 TABLET, ORALLY DISINTEGRATING ORAL PRN
COMMUNITY
Start: 2017-02-27 | End: 2018-04-03

## 2018-02-13 RX ORDER — PROPRANOLOL HYDROCHLORIDE 20 MG/1
20 TABLET ORAL DAILY
COMMUNITY
Start: 2017-02-27 | End: 2018-04-03

## 2018-02-13 RX ORDER — DIPHENOXYLATE HYDROCHLORIDE AND ATROPINE SULFATE 2.5; .025 MG/1; MG/1
1 TABLET ORAL DAILY
COMMUNITY
Start: 2013-01-10

## 2018-02-13 RX ORDER — IBUPROFEN 200 MG
600 TABLET ORAL EVERY 6 HOURS PRN
COMMUNITY
Start: 2013-01-10 | End: 2021-08-10

## 2018-03-15 ENCOUNTER — HOSPITAL ENCOUNTER (OUTPATIENT)
Dept: MAMMOGRAPHY | Facility: OTHER | Age: 58
Discharge: HOME | End: 2018-03-15
Attending: FAMILY MEDICINE | Admitting: FAMILY MEDICINE
Payer: COMMERCIAL

## 2018-03-15 DIAGNOSIS — Z12.31 VISIT FOR SCREENING MAMMOGRAM: ICD-10-CM

## 2018-03-15 PROCEDURE — 77067 SCR MAMMO BI INCL CAD: CPT

## 2018-03-16 ENCOUNTER — OFFICE VISIT (OUTPATIENT)
Dept: FAMILY MEDICINE | Facility: OTHER | Age: 58
End: 2018-03-16
Attending: NURSE PRACTITIONER
Payer: COMMERCIAL

## 2018-03-16 VITALS
HEIGHT: 69 IN | BODY MASS INDEX: 34.96 KG/M2 | HEART RATE: 68 BPM | SYSTOLIC BLOOD PRESSURE: 120 MMHG | RESPIRATION RATE: 16 BRPM | WEIGHT: 236 LBS | DIASTOLIC BLOOD PRESSURE: 70 MMHG | TEMPERATURE: 98.4 F

## 2018-03-16 DIAGNOSIS — R39.89 URINARY PROBLEM: ICD-10-CM

## 2018-03-16 DIAGNOSIS — N30.00 ACUTE CYSTITIS WITHOUT HEMATURIA: Primary | ICD-10-CM

## 2018-03-16 DIAGNOSIS — R30.0 DYSURIA: ICD-10-CM

## 2018-03-16 LAB
ALBUMIN UR-MCNC: NEGATIVE MG/DL
APPEARANCE UR: CLEAR
BACTERIA #/AREA URNS HPF: ABNORMAL /HPF
BILIRUB UR QL STRIP: NEGATIVE
COLOR UR AUTO: YELLOW
GLUCOSE UR STRIP-MCNC: NEGATIVE MG/DL
HGB UR QL STRIP: NEGATIVE
KETONES UR STRIP-MCNC: NEGATIVE MG/DL
LEUKOCYTE ESTERASE UR QL STRIP: ABNORMAL
NITRATE UR QL: NEGATIVE
PH UR STRIP: 7 PH (ref 5–7)
RBC #/AREA URNS AUTO: ABNORMAL /HPF
SOURCE: ABNORMAL
SP GR UR STRIP: <1.005 (ref 1–1.03)
UROBILINOGEN UR STRIP-ACNC: 0.2 EU/DL (ref 0.2–1)
WBC #/AREA URNS AUTO: ABNORMAL /HPF

## 2018-03-16 PROCEDURE — 99213 OFFICE O/P EST LOW 20 MIN: CPT | Performed by: NURSE PRACTITIONER

## 2018-03-16 PROCEDURE — 87088 URINE BACTERIA CULTURE: CPT | Performed by: NURSE PRACTITIONER

## 2018-03-16 PROCEDURE — 87086 URINE CULTURE/COLONY COUNT: CPT | Performed by: NURSE PRACTITIONER

## 2018-03-16 PROCEDURE — 81001 URINALYSIS AUTO W/SCOPE: CPT | Performed by: NURSE PRACTITIONER

## 2018-03-16 RX ORDER — NITROFURANTOIN 25; 75 MG/1; MG/1
100 CAPSULE ORAL 2 TIMES DAILY
Qty: 10 CAPSULE | Refills: 0 | Status: SHIPPED | OUTPATIENT
Start: 2018-03-16 | End: 2018-03-21

## 2018-03-16 ASSESSMENT — PAIN SCALES - GENERAL: PAINLEVEL: NO PAIN (0)

## 2018-03-16 NOTE — NURSING NOTE
Patient presents to clinic with complaint of burning with urination and strong smelling urine. Patient has felt this coming on for a couple weeks and has been drinking cranberry juice. The burning began this morning.  Ilya Lemos LPN...... 11:01 AM 3/16/2018

## 2018-03-16 NOTE — MR AVS SNAPSHOT
"              After Visit Summary   3/16/2018    Nicole Perera    MRN: [id number]           Patient Information     Date Of Birth          1960        Visit Information        Provider Department      3/16/2018 10:30 AM Kassidy Page NP [facility]        Today's Diagnoses     Acute cystitis without hematuria    -  1    Urinary problem        Dysuria           Follow-ups after your visit        Follow-up notes from your care team     Return if symptoms worsen or fail to improve.      Who to contact     If you have questions or need follow up information about today's clinic visit or your schedule please contact [facility] directly at [phone].  Normal or non-critical lab and imaging results will be communicated to you by Quorumhart, letter or phone within 4 business days after the clinic has received the results. If you do not hear from us within 7 days, please contact the clinic through Quorumhart or phone. If you have a critical or abnormal lab result, we will notify you by phone as soon as possible.  Submit refill requests through Music Nation or call your pharmacy and they will forward the refill request to us. Please allow 3 business days for your refill to be completed.          Additional Information About Your Visit        MyChart Information     Music Nation lets you send messages to your doctor, view your test results, renew your prescriptions, schedule appointments and more. To sign up, go to [email or web address] . Click on \"Log in\" on the left side of the screen, which will take you to the Welcome page. Then click on \"Sign up Now\" on the right side of the page.     You will be asked to enter the access code listed below, as well as some personal information. Please follow the directions to create your username and password.     Your access code is: [email or web address]  Expires: 2018  9:31 AM     Your access code will  in 90 days. If you need help or a " "new code, please call your [facility] clinic or [phone].        Care EveryWhere ID     This is your Care EveryWhere ID. This could be used by other organizations to access your [facility] medical records  [id number]        Your Vitals Were     Pulse Temperature Respirations Height BMI (Body Mass Index)       68 98.4  F (36.9  C) (Tympanic) 16 5' 9\" (1.753 m) 34.85 kg/m2        Blood Pressure from Last 3 Encounters:   03/16/18 120/70   12/14/17 110/64   03/21/17 120/76    Weight from Last 3 Encounters:   03/16/18 236 lb (107 kg)   12/14/17 234 lb 6.4 oz (106.3 kg)   03/21/17 227 lb 6.4 oz (103.1 kg)              We Performed the Following     *UA reflex to Microscopic     Urine Culture Aerobic Bacterial     Urine Microscopic          Today's Medication Changes          These changes are accurate as of 3/16/18  5:08 PM.  If you have any questions, ask your nurse or doctor.               Start taking these medicines.        Dose/Directions    nitroFURantoin (macrocrystal-monohydrate) 100 MG capsule   Commonly known as:  MACROBID   Used for:  Acute cystitis without hematuria   Started by:  Kassidy aPge, NP        Dose:  100 mg   Take 1 capsule (100 mg) by mouth 2 times daily for 5 days   Quantity:  10 capsule   Refills:  0            Where to get your medicines      These medications were sent to [facility], - [place], [place] - [address]  [address], [place] [address]     Phone:  [phone]     nitroFURantoin (macrocrystal-monohydrate) 100 MG capsule                Primary Care Provider Office Phone # Fax #    Lillie Concetta Saldaña [phone]       [address]  [place] [address]        Equal Access to Services     MAURICE LIZARRAGA AH: Geoff griffiths Sogin, waaxda luqadaha, qaybta kaalmada adechiquiyamarsha, dee campos. [facility] [phone].    ATENCIÓN: Si habla aniyah, tiene a redd disposición servicios " luis de asistencia lingüística. Saturnino basilio [phone].    We comply with applicable federal civil rights laws and [place] laws. We do not discriminate on the basis of race, color, national origin, age, disability, sex, sexual orientation, or gender identity.            Thank you!     Thank you for choosing [facility] AND \[facility]\""  for your care. Our goal is always to provide you with excellent care. Hearing back from our patients is one way we can continue to improve our services. Please take a few minutes to complete the written survey that you may receive in the mail after your visit with us. Thank you!             Your Updated Medication List - Protect others around you: Learn how to safely use, store and throw away your medicines at www.disposemymeds.org.          This list is accurate as of 3/16/18  5:08 PM.  Always use your most recent med list.                   Brand Name Dispense Instructions for use Diagnosis    aspirin EC 81 MG EC tablet      Take 81 mg by mouth daily        calcium-vitamin D 600-400 MG-UNIT per tablet    CALTRATE     Take 1 tablet by mouth 2 times daily (with meals)        conjugated estrogens cream    PREMARIN     daily        fish oil-omega-3 fatty acids 1000 MG capsule      Take 1 capsule by mouth daily        ibuprofen 200 MG tablet    ADVIL/MOTRIN     Take 600 mg by mouth every 6 hours as needed for pain        MULTI-VITAMINS Tabs      Take 1 tablet by mouth daily        nitroFURantoin (macrocrystal-monohydrate) 100 MG capsule    MACROBID    10 capsule    Take 1 capsule (100 mg) by mouth 2 times daily for 5 days    Acute cystitis without hematuria       propranolol 20 MG tablet    INDERAL     Take 20 mg by mouth daily        rizatriptan 10 MG ODT tab    MAXALT-MLT     Take 1 tablet by mouth as needed

## 2018-03-18 LAB
BACTERIA SPEC CULT: ABNORMAL
SPECIMEN SOURCE: ABNORMAL

## 2018-04-03 ENCOUNTER — OFFICE VISIT (OUTPATIENT)
Dept: FAMILY MEDICINE | Facility: OTHER | Age: 58
End: 2018-04-03
Attending: FAMILY MEDICINE
Payer: COMMERCIAL

## 2018-04-03 VITALS
HEIGHT: 69 IN | BODY MASS INDEX: 34.66 KG/M2 | HEART RATE: 66 BPM | DIASTOLIC BLOOD PRESSURE: 78 MMHG | SYSTOLIC BLOOD PRESSURE: 124 MMHG | WEIGHT: 234 LBS

## 2018-04-03 DIAGNOSIS — N39.0 RECURRENT UTI: ICD-10-CM

## 2018-04-03 DIAGNOSIS — G43.909 MIGRAINE WITHOUT STATUS MIGRAINOSUS, NOT INTRACTABLE, UNSPECIFIED MIGRAINE TYPE: ICD-10-CM

## 2018-04-03 DIAGNOSIS — Z00.00 HEALTH CARE MAINTENANCE: ICD-10-CM

## 2018-04-03 DIAGNOSIS — Z23 NEED FOR TDAP VACCINATION: ICD-10-CM

## 2018-04-03 DIAGNOSIS — R10.9 LEFT FLANK PAIN: Primary | ICD-10-CM

## 2018-04-03 DIAGNOSIS — Z13.1 SCREENING FOR DIABETES MELLITUS: ICD-10-CM

## 2018-04-03 DIAGNOSIS — Z11.59 NEED FOR HEPATITIS C SCREENING TEST: ICD-10-CM

## 2018-04-03 DIAGNOSIS — Z78.0 POSTMENOPAUSAL STATUS: ICD-10-CM

## 2018-04-03 DIAGNOSIS — Z12.4 SCREENING FOR CERVICAL CANCER: ICD-10-CM

## 2018-04-03 DIAGNOSIS — Z13.220 SCREENING FOR HYPERLIPIDEMIA: ICD-10-CM

## 2018-04-03 PROCEDURE — 99214 OFFICE O/P EST MOD 30 MIN: CPT | Mod: 25 | Performed by: FAMILY MEDICINE

## 2018-04-03 PROCEDURE — G0123 SCREEN CERV/VAG THIN LAYER: HCPCS | Performed by: FAMILY MEDICINE

## 2018-04-03 PROCEDURE — 90715 TDAP VACCINE 7 YRS/> IM: CPT | Performed by: FAMILY MEDICINE

## 2018-04-03 PROCEDURE — 90471 IMMUNIZATION ADMIN: CPT | Performed by: FAMILY MEDICINE

## 2018-04-03 PROCEDURE — 87624 HPV HI-RISK TYP POOLED RSLT: CPT | Performed by: FAMILY MEDICINE

## 2018-04-03 PROCEDURE — 88142 CYTOPATH C/V THIN LAYER: CPT | Performed by: FAMILY MEDICINE

## 2018-04-03 RX ORDER — PROPRANOLOL HYDROCHLORIDE 20 MG/1
20 TABLET ORAL DAILY
Qty: 90 TABLET | Refills: 3 | Status: SHIPPED | OUTPATIENT
Start: 2018-04-03 | End: 2019-05-03

## 2018-04-03 RX ORDER — BIOTIN 10000 MCG
10 CAPSULE ORAL DAILY
COMMUNITY
End: 2018-05-15

## 2018-04-03 RX ORDER — RIZATRIPTAN BENZOATE 10 MG/1
10 TABLET, ORALLY DISINTEGRATING ORAL PRN
Qty: 10 TABLET | Refills: 11 | Status: SHIPPED | OUTPATIENT
Start: 2018-04-03 | End: 2019-05-03

## 2018-04-03 NOTE — MR AVS SNAPSHOT
After Visit Summary   4/3/2018    Nicole Perera    MRN: [id number]           Patient Information     Date Of Birth          1960        Visit Information        Provider Department      4/3/2018 11:00 AM Lillie Saldaña MD [facility]        Today's Diagnoses     Migraine without status migrainosus, not intractable, unspecified migraine type    -  1    Screening for cervical cancer        Need for hepatitis C screening test        Postmenopausal status        Need for Tdap vaccination        Screening for hyperlipidemia        Screening for diabetes mellitus        Recurrent UTI        Left flank pain           Follow-ups after your visit        Future tests that were ordered for you today     Open Future Orders        Priority Expected Expires Ordered    CT Abdomen Pelvis w/o & w Contrast Routine  4/3/2019 4/3/2018    Hepatitis C antibody Routine  4/3/2019 4/3/2018    **Glucose FUTURE anytime Routine 4/3/2018 4/3/2019 4/3/2018    Lipid Profile Routine  4/3/2019 4/3/2018    DX Hip/Pelvis/Spine Routine  4/3/2019 4/3/2018            Who to contact     If you have questions or need follow up information about today's clinic visit or your schedule please contact [facility] directly at [phone].  Normal or non-critical lab and imaging results will be communicated to you by MyChart, letter or phone within 4 business days after the clinic has received the results. If you do not hear from us within 7 days, please contact the clinic through ChoreMonsterhart or phone. If you have a critical or abnormal lab result, we will notify you by phone as soon as possible.  Submit refill requests through Pluromed or call your pharmacy and they will forward the refill request to us. Please allow 3 business days for your refill to be completed.          Additional Information About Your Visit        MyChart Information     Pluromed gives you secure access to your  "electronic health record. If you see a primary care provider, you can also send messages to your care team and make appointments. If you have questions, please call your primary care clinic.  If you do not have a primary care provider, please call [phone] and they will assist you.        Care EveryWhere ID     This is your Care EveryWhere ID. This could be used by other organizations to access your [facility] medical records  [id number]        Your Vitals Were     Pulse Height BMI (Body Mass Index)             66 5' 9\" (1.753 m) 34.56 kg/m2          Blood Pressure from Last 3 Encounters:   04/03/18 124/78   03/16/18 120/70   12/14/17 110/64    Weight from Last 3 Encounters:   04/03/18 234 lb (106.1 kg)   03/16/18 236 lb (107 kg)   12/14/17 234 lb 6.4 oz (106.3 kg)              We Performed the Following     HPV High Risk Types DNA Cervical     Pap Screen Thin Prep with HPV - recommended age 30 - 65 years (select HPV order below)     TDAP VACCINE (BOOSTRIX )          Where to get your medicines      These medications were sent to [facility], - [place], [place] - [address]  [address], [place] [address]     Phone:  [phone]     propranolol 20 MG tablet    rizatriptan 10 MG ODT tab          Primary Care Provider Office Phone # Fax #    Lillie Concetta Saldaña [phone]       [address] [facility] [address]        Equal Access to Services     [facility] AH: Hadii miroslava agarwal hadasho Soomaali, waaxda luqadaha, qaybta kaalmada adeegyves, dee campos. So [facility] [phone].    ATENCIÓN: Si habla español, tiene a redd disposición servicios gratuitos de asistencia lingüística. Llame al [phone].    We comply with applicable federal civil rights laws and [place] laws. We do not discriminate on the basis of race, color, national origin, age, disability, sex, sexual orientation, or gender identity.          "   Thank you!     Thank you for choosing [facility] AND \[facility]\""  for your care. Our goal is always to provide you with excellent care. Hearing back from our patients is one way we can continue to improve our services. Please take a few minutes to complete the written survey that you may receive in the mail after your visit with us. Thank you!             Your Updated Medication List - Protect others around you: Learn how to safely use, store and throw away your medicines at www.disposemymeds.org.          This list is accurate as of 4/3/18 11:24 AM.  Always use your most recent med list.                   Brand Name Dispense Instructions for use Diagnosis    aspirin EC 81 MG EC tablet      Take 81 mg by mouth daily        Biotin 10 MG Caps      Take 10 mg by mouth daily        calcium-vitamin D 600-400 MG-UNIT per tablet    CALTRATE     Take 1 tablet by mouth 2 times daily (with meals)        conjugated estrogens cream    PREMARIN     daily        fish oil-omega-3 fatty acids 1000 MG capsule      Take 1 capsule by mouth daily        ibuprofen 200 MG tablet    ADVIL/MOTRIN     Take 600 mg by mouth every 6 hours as needed for pain        MULTI-VITAMINS Tabs      Take 1 tablet by mouth daily        propranolol 20 MG tablet    INDERAL    90 tablet    Take 1 tablet (20 mg) by mouth daily    Migraine without status migrainosus, not intractable, unspecified migraine type       rizatriptan 10 MG ODT tab    MAXALT-MLT    10 tablet    Take 1 tablet (10 mg) by mouth as needed    Migraine without status migrainosus, not intractable, unspecified migraine type

## 2018-04-03 NOTE — PROGRESS NOTES
SUBJECTIVE:   Nicole Perera is a 57 year old female who presents to clinic today for a physical.    Has had some left flank pain intermittently.  Worse if she is standing at the sink doing dishes for more than about 20 minutes. Also sometimes hurts when she is laying down trying to sleep at night.  Has had a number of urinary tract infections in the past year.  No hematuria.  No night sweats or weight loss.  No ongoing fever.      She still has a migraine at least once a month.  One trigger that she has realized are allergies and she has tried to be more mindful of keeping windows closed in the summer to prevent allergens from entering the house.  She is uncertain if any foods are triggers.    HPI      Patient Active Problem List    Diagnosis Date Noted     Fibrocystic breast disease 2018     Priority: Medium     Migraine headache 2018     Priority: Medium     Dyspareunia (CODE) 2015     Priority: Medium     Rosacea 2015     Priority: Medium     Past Medical History:   Diagnosis Date     Abnormal cytological finding in specimen from cervix uteri     Hx of abnormal Pap with atypical squamous colp  showed benign reactive  epithelial changes, subsequent Paps have been normal.     Contact with and suspected exposure to communicable disease     Bat bite, left side of neck     Personal history of other medical treatment (CODE)          Personal history of other medical treatment (CODE)     HX of abnormal Pap with atypical squamous colp  showed benign reactive epithelial changes, subsequent Paps have been normal      Past Surgical History:   Procedure Laterality Date     APPENDECTOMY OPEN      No Comments Provided     BIOPSY BREAST      No Comments Provided      SECTION      ,Rt breast biopsy for fibrocystic breast disease~     COLONOSCOPY      2010,Colonoscopy-  normal  Next due .     ESOPHAGOSCOPY, GASTROSCOPY, DUODENOSCOPY (EGD), COMBINED       07,Esophagogastroduodenoscopy     Family History   Problem Relation Age of Onset     HEART DISEASE Father 50     Heart Disease     Other - See Comments Father      Stroke     Other - See Comments Maternal Aunt      Migraines     Thyroid Disease Mother      Thyroid Disease,Thyroid disorder -after being stepped on by a cow     DIABETES Mother      Diabetes     DIABETES Brother      Diabetes     Other - See Comments Sister      CP     Substance Abuse Brother 44     Alcohol/Drug, ETOHism-     Social History   Substance Use Topics     Smoking status: Never Smoker     Smokeless tobacco: Never Used     Alcohol use 0.6 oz/week     Social History     Social History Narrative    Does not smoke, guns at home are locked, minimal ETOH, does use sunscreen, uses seat belts 100% of the time.    .  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  Has a degree in health information management.    Arthur - son - attending [employer] in nursing.    Antonio -      Current Outpatient Prescriptions   Medication Sig Dispense Refill     Biotin 10 MG CAPS Take 10 mg by mouth daily       propranolol (INDERAL) 20 MG tablet Take 1 tablet (20 mg) by mouth daily 90 tablet 3     rizatriptan (MAXALT-MLT) 10 MG ODT tab Take 1 tablet (10 mg) by mouth as needed 10 tablet 11     aspirin EC 81 MG EC tablet Take 81 mg by mouth daily       calcium-vitamin D (CALTRATE) 600-400 MG-UNIT per tablet Take 1 tablet by mouth 2 times daily (with meals)       conjugated estrogens (PREMARIN) cream daily       ibuprofen (ADVIL/MOTRIN) 200 MG tablet Take 600 mg by mouth every 6 hours as needed for pain       Multiple Vitamin (MULTI-VITAMINS) TABS Take 1 tablet by mouth daily       fish oil-omega-3 fatty acids 1000 MG capsule Take 1 capsule by mouth daily       Allergies   Allergen Reactions     Topiramate      Other reaction(s): Dizziness       Review of Systems   Constitutional: Negative for activity change, appetite change and fever.  "  Respiratory: Negative for cough.    Genitourinary: Negative for dysuria and hematuria.   Neurological: Positive for headaches.        OBJECTIVE:     /78 (BP Location: Right arm, Patient Position: Sitting, Cuff Size: Adult Large)  Pulse 66  Ht 5' 9\" (1.753 m)  Wt 234 lb (106.1 kg)  BMI 34.56 kg/m2  Body mass index is 34.56 kg/(m^2).  Physical Exam   Constitutional: She is oriented to person, place, and time. She appears well-developed and well-nourished. No distress.   HENT:   Head: Normocephalic.   Right Ear: Tympanic membrane and external ear normal.   Left Ear: Tympanic membrane and external ear normal.   Nose: Nose normal.   Mouth/Throat: Oropharynx is clear and moist. No oropharyngeal exudate.   Eyes: Conjunctivae are normal. Pupils are equal, round, and reactive to light. Right eye exhibits no discharge. Left eye exhibits no discharge.   Neck: Neck supple. No tracheal deviation present. No thyromegaly present.   Cardiovascular: Normal rate, regular rhythm, S1 normal, S2 normal, normal heart sounds, intact distal pulses and normal pulses.  Exam reveals no gallop, no S3, no S4 and no friction rub.    No murmur heard.  Pulmonary/Chest: Effort normal and breath sounds normal. No respiratory distress. She has no wheezes. She has no rales.   Breast exam:  No masses palpable.  No skin changes, tethering or axillary lymphadenopathy.   Abdominal: Soft. Bowel sounds are normal. She exhibits no distension and no mass. There is no hepatosplenomegaly. There is no tenderness.   Genitourinary: No breast swelling, tenderness or discharge.   Genitourinary Comments: Pelvic exam: Cervix is pink without lesions.  It is parous.  Pap completed.  No lesions noted on vaginal mucosa.  Bimanual exam shows no cervical motion tenderness.  Uterus and adnexa are normal in size without masses palpable.   Musculoskeletal: Normal range of motion. She exhibits no edema.   Lymphadenopathy:     She has no cervical adenopathy. "   Neurological: She is alert and oriented to person, place, and time. She has normal strength and normal reflexes. She exhibits normal muscle tone.   Skin: Skin is warm and dry. No rash noted.   Psychiatric: She has a normal mood and affect. Judgment and thought content normal. Cognition and memory are normal.       No flowsheet data found.    PHQ-2 Score:     PHQ-2 ( 1999 Pfizer) 4/3/2018 3/16/2018   Q1: Little interest or pleasure in doing things 0 0   Q2: Feeling down, depressed or hopeless 0 0   PHQ-2 Score 0 0         Diagnostic Test Results:  none     ASSESSMENT/PLAN:       ICD-10-CM    1. Left flank pain R10.9 CT Abdomen Pelvis w/o & w Contrast   2. Recurrent UTI N39.0 CT Abdomen Pelvis w/o & w Contrast   3. Migraine without status migrainosus, not intractable, unspecified migraine type G43.909 propranolol (INDERAL) 20 MG tablet     rizatriptan (MAXALT-MLT) 10 MG ODT tab   4. Screening for cervical cancer Z12.4 HPV High Risk Types DNA Cervical     Pap Screen Thin Prep with HPV - recommended age 30 - 65 years (select HPV order below)   5. Need for hepatitis C screening test Z11.59 Hepatitis C antibody     CANCELED: Hepatitis C antibody   6. Postmenopausal status Z78.0 DX Hip/Pelvis/Spine   7. Need for Tdap vaccination Z23 TDAP VACCINE (BOOSTRIX )   8. Screening for hyperlipidemia Z13.220 Lipid Profile   9. Screening for diabetes mellitus Z13.1 **Glucose FUTURE anytime   10. Health care maintenance Z00.00        1.  Will evaluate further with CT of abdomen and pelvis.  2.  See #1.  3.  Propranolol and Maxalt were refilled today.  Discussed trying dietary changes such as eliminating gluten and or dairy to see if this makes any difference with her headache pattern.  Also discussed the whole 30 program as an elimination diet to help pinpoint whether there might be dietary triggers as well.  4.  Pap completed today.  5.  She requested hepatitis C screening today.  This was ordered.  6.  DEXA scan ordered.  7.  Tdap  updated today.  8.  Fasting lipid profile will be done with labs.  9.  Fasting glucose will also be done with labs.  10.  DEXA and Pap as above.  Mammogram is up-to-date.  Colonoscopy is up-to-date.  Other vaccines are up-to-date.    Lillie Saldaña MD  [facility]

## 2018-04-04 ASSESSMENT — ENCOUNTER SYMPTOMS
ACTIVITY CHANGE: 0
DYSURIA: 0
HEADACHES: 1
FEVER: 0
APPETITE CHANGE: 0
COUGH: 0
HEMATURIA: 0

## 2018-04-09 ENCOUNTER — TELEPHONE (OUTPATIENT)
Dept: FAMILY MEDICINE | Facility: OTHER | Age: 58
End: 2018-04-09

## 2018-04-09 DIAGNOSIS — Z11.59 NEED FOR HEPATITIS C SCREENING TEST: ICD-10-CM

## 2018-04-09 DIAGNOSIS — Z13.1 SCREENING FOR DIABETES MELLITUS: ICD-10-CM

## 2018-04-09 DIAGNOSIS — Z01.812 PRE-PROCEDURE LAB EXAM: Primary | ICD-10-CM

## 2018-04-09 DIAGNOSIS — Z13.220 SCREENING FOR HYPERLIPIDEMIA: ICD-10-CM

## 2018-04-09 LAB
CHOLEST SERPL-MCNC: 223 MG/DL
FINAL DIAGNOSIS: NORMAL
GLUCOSE SERPL-MCNC: 93 MG/DL (ref 70–105)
HDLC SERPL-MCNC: 42 MG/DL (ref 23–92)
HPV HR 12 DNA CVX QL NAA+PROBE: NEGATIVE
HPV16 DNA SPEC QL NAA+PROBE: NEGATIVE
HPV18 DNA SPEC QL NAA+PROBE: NEGATIVE
LDLC SERPL CALC-MCNC: 141 MG/DL
NONHDLC SERPL-MCNC: 181 MG/DL
SPECIMEN DESCRIPTION: NORMAL
SPECIMEN SOURCE CVX/VAG CYTO: NORMAL
TRIGL SERPL-MCNC: 198 MG/DL

## 2018-04-09 PROCEDURE — 82565 ASSAY OF CREATININE: CPT | Performed by: FAMILY MEDICINE

## 2018-04-09 PROCEDURE — 86803 HEPATITIS C AB TEST: CPT | Performed by: FAMILY MEDICINE

## 2018-04-09 PROCEDURE — 36415 COLL VENOUS BLD VENIPUNCTURE: CPT | Performed by: FAMILY MEDICINE

## 2018-04-09 PROCEDURE — 82947 ASSAY GLUCOSE BLOOD QUANT: CPT | Performed by: FAMILY MEDICINE

## 2018-04-09 PROCEDURE — 80061 LIPID PANEL: CPT | Performed by: FAMILY MEDICINE

## 2018-04-09 NOTE — TELEPHONE ENCOUNTER
Radiology scheduler called and they need a creatine ordered .  Gwendolyn Boogie LPN ....................4/9/2018  3:06 PM

## 2018-04-10 ENCOUNTER — APPOINTMENT (OUTPATIENT)
Dept: LAB | Facility: OTHER | Age: 58
End: 2018-04-10
Attending: FAMILY MEDICINE
Payer: COMMERCIAL

## 2018-04-10 ENCOUNTER — HOSPITAL ENCOUNTER (OUTPATIENT)
Dept: CT IMAGING | Facility: OTHER | Age: 58
Discharge: HOME | End: 2018-04-10
Attending: FAMILY MEDICINE | Admitting: FAMILY MEDICINE
Payer: COMMERCIAL

## 2018-04-10 DIAGNOSIS — N39.0 RECURRENT UTI: ICD-10-CM

## 2018-04-10 DIAGNOSIS — Z01.812 PRE-PROCEDURE LAB EXAM: ICD-10-CM

## 2018-04-10 DIAGNOSIS — R10.9 LEFT FLANK PAIN: ICD-10-CM

## 2018-04-10 LAB
CREAT SERPL-MCNC: 0.99 MG/DL (ref 0.6–1.2)
GFR SERPL CREATININE-BSD FRML MDRD: 58 ML/MIN/1.7M2
HCV AB SERPL QL IA: NONREACTIVE

## 2018-04-10 PROCEDURE — 74176 CT ABD & PELVIS W/O CONTRAST: CPT

## 2018-05-15 ENCOUNTER — HOSPITAL ENCOUNTER (EMERGENCY)
Facility: OTHER | Age: 58
Discharge: HOME | End: 2018-05-15
Attending: EMERGENCY MEDICINE | Admitting: EMERGENCY MEDICINE
Payer: OTHER MISCELLANEOUS

## 2018-05-15 VITALS
HEART RATE: 58 BPM | WEIGHT: 228 LBS | TEMPERATURE: 97.2 F | HEIGHT: 69 IN | RESPIRATION RATE: 16 BRPM | BODY MASS INDEX: 33.77 KG/M2 | SYSTOLIC BLOOD PRESSURE: 122 MMHG | DIASTOLIC BLOOD PRESSURE: 76 MMHG | OXYGEN SATURATION: 96 %

## 2018-05-15 DIAGNOSIS — S01.81XA LACERATION OF FOREHEAD, INITIAL ENCOUNTER: ICD-10-CM

## 2018-05-15 DIAGNOSIS — S09.90XA CLOSED HEAD INJURY, INITIAL ENCOUNTER: ICD-10-CM
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
## 2018-05-15 PROCEDURE — 25000125 ZZHC RX 250: Performed by: EMERGENCY MEDICINE

## 2018-05-15 PROCEDURE — 99283 EMERGENCY DEPT VISIT LOW MDM: CPT | Performed by: EMERGENCY MEDICINE

## 2018-05-15 PROCEDURE — 25000132 ZZH RX MED GY IP 250 OP 250 PS 637: Performed by: EMERGENCY MEDICINE

## 2018-05-15 PROCEDURE — 99283 EMERGENCY DEPT VISIT LOW MDM: CPT | Mod: Z6 | Performed by: EMERGENCY MEDICINE

## 2018-05-15 RX ORDER — GINSENG 100 MG
500 CAPSULE ORAL ONCE
Status: COMPLETED | OUTPATIENT
Start: 2018-05-15 | End: 2018-05-15

## 2018-05-15 RX ADMIN — IBUPROFEN 600 MG: 400 TABLET ORAL at 10:21

## 2018-05-15 RX ADMIN — BACITRACIN 1 G: 500 OINTMENT TOPICAL at 09:44

## 2018-05-15 ASSESSMENT — ENCOUNTER SYMPTOMS
ARTHRALGIAS: 0
VOMITING: 0
SHORTNESS OF BREATH: 0
CHILLS: 0
DYSURIA: 0
CHEST TIGHTNESS: 0
WOUND: 1
FEVER: 0
LIGHT-HEADEDNESS: 0
NAUSEA: 0
AGITATION: 0

## 2018-05-15 NOTE — ED AVS SNAPSHOT
[facility]    [address]    [place] [address]    Phone:  [phone]    Fax:  [phone]                                       Nicole Perera   MRN: [id number]    Department:  [facility]   Date of Visit:  5/15/2018           After Visit Summary Signature Page     I have received my discharge instructions, and my questions have been answered. I have discussed any challenges I see with this plan with the nurse or doctor.    ..........................................................................................................................................  Patient/Patient Representative Signature      ..........................................................................................................................................  Patient Representative Print Name and Relationship to Patient    ..................................................               ................................................  Date                                            Time    ..........................................................................................................................................  Reviewed by Signature/Title    ...................................................              ..............................................  Date                                                            Time

## 2018-05-15 NOTE — ED PROVIDER NOTES
History     Chief Complaint   Patient presents with     Laceration     Fall     Patient is a 57 year old female presenting with fall. The history is provided by the patient.   Trauma  Mechanism of injury: fall     Current symptoms:       Associated symptoms:             Denies chest pain, nausea and vomiting.     Nicole Perera is a 57 year old female who slipped and fell walking in the hallway at work. She fell to her knees and struck the left side of her forehead against a door jam. No loss of consciousness. She did not try to get up on her own but called for help. A wheelchair was brought to her and she was helped into the wheelchair and then brought here. She has a large area of swelling on her left upper forehead with a very small laceration. She was complaining of some pain in her knees but she says this feels fine now. She has very minimal pain and is not asking for anything for the pain. She says there is a little bit of pain superficially at the site but otherwise no headache. No dizziness. No vision or hearing changes. She feels she is mentating clearly. No numbness tingling or weakness. When she got up to transfer into the bed she did not feel at all dizzy or lightheaded.    Problem List:    Patient Active Problem List    Diagnosis Date Noted     Fibrocystic breast disease 02/13/2018     Priority: Medium     Migraine headache 02/13/2018     Priority: Medium     Dyspareunia (CODE) 02/16/2015     Priority: Medium     Rosacea 02/16/2015     Priority: Medium        Past Medical History:    Past Medical History:   Diagnosis Date     Abnormal cytological finding in specimen from cervix uteri      Contact with and suspected exposure to communicable disease      Personal history of other medical treatment (CODE)      Personal history of other medical treatment (CODE)        Past Surgical History:    Past Surgical History:   Procedure Laterality Date     APPENDECTOMY OPEN      No Comments Provided     BIOPSY  BREAST      No Comments Provided      SECTION      ,Rt breast biopsy for fibrocystic breast disease~     COLONOSCOPY      2010,Colonoscopy-  normal  Next due .     ESOPHAGOSCOPY, GASTROSCOPY, DUODENOSCOPY (EGD), COMBINED      07,Esophagogastroduodenoscopy       Family History:    Family History   Problem Relation Age of Onset     HEART DISEASE Father 50     Heart Disease     Other - See Comments Father      Stroke     Other - See Comments Maternal Aunt      Migraines     Thyroid Disease Mother      Thyroid Disease,Thyroid disorder -after being stepped on by a cow     DIABETES Mother      Diabetes     DIABETES Brother      Diabetes     Other - See Comments Sister      CP     Substance Abuse Brother 44     Alcohol/Drug, ETOHism-       Social History:  Marital Status:   [2]  Social History   Substance Use Topics     Smoking status: Never Smoker     Smokeless tobacco: Never Used     Alcohol use 0.6 oz/week        Medications:      aspirin EC 81 MG EC tablet   calcium-vitamin D (CALTRATE) 600-400 MG-UNIT per tablet   conjugated estrogens (PREMARIN) cream   fish oil-omega-3 fatty acids 1000 MG capsule   ibuprofen (ADVIL/MOTRIN) 200 MG tablet   MAGNESIUM OXIDE PO   Multiple Vitamin (MULTI-VITAMINS) TABS   propranolol (INDERAL) 20 MG tablet   rizatriptan (MAXALT-MLT) 10 MG ODT tab         Review of Systems   Constitutional: Negative for chills and fever.   HENT: Negative for congestion.    Eyes: Negative for visual disturbance.   Respiratory: Negative for chest tightness and shortness of breath.    Cardiovascular: Negative for chest pain.   Gastrointestinal: Negative for nausea and vomiting.   Genitourinary: Negative for dysuria.   Musculoskeletal: Negative for arthralgias.   Skin: Positive for wound. Negative for rash.   Neurological: Negative for light-headedness.   Psychiatric/Behavioral: Negative for agitation.       Physical Exam   BP: 148/90  Pulse: 58  Temp: 97.2  F (36.2  " C)  Resp: 16  Height: 175.3 cm (5' 9\")  Weight: 103.4 kg (228 lb)  SpO2: 98 %      Physical Exam   Constitutional: She is oriented to person, place, and time. She appears well-developed and well-nourished. No distress.   HENT:   Head: Normocephalic.   She has some swelling in the left upper forehead with a small 1 cm superficial laceration that is oozing a small amount of blood.   Eyes: Conjunctivae and EOM are normal. Pupils are equal, round, and reactive to light.   Neck: Neck supple.   Cardiovascular: Normal rate.    Pulmonary/Chest: Effort normal.   Neurological: She is alert and oriented to person, place, and time. She has normal strength. GCS eye subscore is 4. GCS verbal subscore is 5. GCS motor subscore is 6.   CN 2-12 intact   Skin: Skin is warm and dry. She is not diaphoretic.   Psychiatric: She has a normal mood and affect. Her behavior is normal.   Nursing note and vitals reviewed.      ED Course     ED Course     Procedures      No results found for this or any previous visit (from the past 24 hour(s)).    Medications   bacitracin ointment 1 g (not administered)       Assessments & Plan (with Medical Decision Making)     I have reviewed the nursing notes.    I have reviewed the findings, diagnosis, plan and need for follow up with the patient.  I do not see any signs of closed head injury at this time, however she certainly could develop something in the near future. We discussed signs and symptoms of closed head injury and that she should watch for these for the next 48 hours. The laceration does not require repair and a Band-Aid will be placed over this. She should return if she is feeling worse, otherwise okay to return to normal activity.    New Prescriptions    No medications on file       Final diagnoses:   Fall, initial encounter   Closed head injury, initial encounter   Laceration of forehead, initial encounter       5/15/2018   [facility]     Sergio Phillip MD  05/15/18 " 0992

## 2018-05-15 NOTE — ED AVS SNAPSHOT
` `     [facility]: [phone]                 INTERAGENCY TRANSFER FORM - NOTES (H&P, Discharge Summary, Consults, Procedures, Therapies)   5/15/2018                    Hospital Admission Date: 5/15/2018  FELIPE ARIAS   : 1960  Sex: Female        Patient PCP Information     Provider PCP Type    Lillie Saldaña MD General      History & Physicals     No notes of this type exist for this encounter.      Discharge Summaries     No notes of this type exist for this encounter.      Consult Notes     No notes of this type exist for this encounter.         Progress Notes - Physician (Notes from 18 through 05/15/18)      ED Notes by Magdalena Santos RN at 5/15/2018  9:20 AM     Author:  Magdalena Santos RN Service:  (none) Author Type:  Registered Nurse    Filed:  5/15/2018 10:27 AM Date of Service:  5/15/2018  9:20 AM Creation Time:  5/15/2018 10:27 AM    Status:  Signed :  Magdalena Santos RN (Registered Nurse)         COLUMBIA-SUICIDE SEVERITY RATING SCALE   Screen with Triage Points for Emergency Department      Ask questions that are bolded and underlined.   Past  month   Ask Questions 1 and 2 YES NO   1)  Have you wished you were dead or wished you could go to sleep and not wake up?   no   2)  Have you actually had any thoughts of killing yourself?   no   If YES to 2, ask questions 3, 4, 5, and 6.  If NO to 2, go directly to question 6.   3)  Have you been thinking about how you might do this?   E.g.  I thought about taking an overdose but I never made a specific plan as to when where or how I would actually do it .and I would never go through with it.       4)  Have you had these thoughts and had some intention of acting on them?   As opposed to  I have the thoughts but I definitely will not do anything about them.       5)  Have you started to work out or worked out the details of how to kill yourself? Do you intend  to carry out this plan?      6)  Have you ever done anything, started to do anything, or prepared to do anything to end your life?  Examples: Collected pills, obtained a gun, gave away valuables, wrote a will or suicide note, took out pills but didn t swallow any, held a gun but changed your mind or it was grabbed from your hand, went to the roof but didn t jump; or actually took pills, tried to shoot yourself, cut yourself, tried to hang yourself, etc.    If YES, ask: Was this within the past three months?  Lifetime     no    Past 3 Months        Item 1:  Behavioral Health Referral at Discharge  Item 2:  Behavioral Health Referral at Discharge   Item 3:  Behavioral Health Consult (Psychiatric Nurse/) and consider Patient Safety Precautions  Item 4:  Immediate Notification of Physician and/or Behavioral Health and Patient Safety Precautions   Item 5:  Immediate Notification of Physician and/or Behavioral Health and Patient Safety Precautions  Item 6:  Over 3 months ago: Behavioral Health Consult (Psychiatric Nurse/) and consider Patient Safety Precautions  OR  Item 6:  3 months ago or less: Immediate Notification of Physician and/or Behavioral Health and Patient Safety Precautions[JP1.1]        Revision History        User Key Date/Time User Provider Type Action    > JP1.1 5/15/2018 10:27 AM Magdalena Santos RN Registered Nurse Sign            ED Provider Notes by Sergio Phillip MD at 5/15/2018  9:13 AM     Author:  Sergio Phillip MD Service:  Emergency Medicine Author Type:  Physician    Filed:  5/15/2018  9:44 AM Date of Service:  5/15/2018  9:13 AM Creation Time:  5/15/2018  9:40 AM    Status:  Signed :  Sergio Phillip MD (Physician)           History[TV1.1]     Chief Complaint   Patient presents with     Laceration     Fall[TV1.2]     Patient is a 57 year old female presenting with fall. The history is provided by the patient.   Trauma  Mechanism of injury: fall      Current symptoms:       Associated symptoms:             Denies chest pain, nausea and vomiting.     Nicole Perera is a 57 year old female who slipped and fell walking in the hallway at work. She fell to her knees and struck the left side of her forehead against a door jam. No loss of consciousness. She did not try to get up on her own but called for help. A wheelchair was brought to her and she was helped into the wheelchair and then brought here. She has a large area of swelling on her left upper forehead with a very small laceration. She was complaining of some pain in her knees but she says this feels fine now. She has very minimal pain and is not asking for anything for the pain. She says there is a little bit of pain superficially at the site but otherwise no headache. No dizziness. No vision or hearing changes. She feels she is mentating clearly. No numbness tingling or weakness. When she got up to transfer into the bed she did not feel at all dizzy or lightheaded.    Problem List:[TV1.1]    Patient Active Problem List    Diagnosis Date Noted     Fibrocystic breast disease 2018     Priority: Medium     Migraine headache 2018     Priority: Medium     Dyspareunia (CODE) 2015     Priority: Medium     Rosacea 2015     Priority: Medium[TV1.2]        Past Medical History:[TV1.1]    Past Medical History:   Diagnosis Date     Abnormal cytological finding in specimen from cervix uteri      Contact with and suspected exposure to communicable disease      Personal history of other medical treatment (CODE)      Personal history of other medical treatment (CODE)[TV1.2]        Past Surgical History:[TV1.1]    Past Surgical History:   Procedure Laterality Date     APPENDECTOMY OPEN      No Comments Provided     BIOPSY BREAST      No Comments Provided      SECTION      ,Rt breast biopsy for fibrocystic breast disease~     COLONOSCOPY      2010,Colonoscopy-  normal  Next due  ".     ESOPHAGOSCOPY, GASTROSCOPY, DUODENOSCOPY (EGD), COMBINED      07,Esophagogastroduodenoscopy[TV1.2]       Family History:[TV1.1]    Family History   Problem Relation Age of Onset     HEART DISEASE Father 50     Heart Disease     Other - See Comments Father      Stroke     Other - See Comments Maternal Aunt      Migraines     Thyroid Disease Mother      Thyroid Disease,Thyroid disorder -after being stepped on by a cow     DIABETES Mother      Diabetes     DIABETES Brother      Diabetes     Other - See Comments Sister      CP     Substance Abuse Brother 44     Alcohol/Drug, ETOHism-[TV1.2]       Social History:  Marital Status:   [2][TV1.1]  Social History   Substance Use Topics     Smoking status: Never Smoker     Smokeless tobacco: Never Used     Alcohol use 0.6 oz/week[TV1.2]        Medications:[TV1.1]      aspirin EC 81 MG EC tablet   calcium-vitamin D (CALTRATE) 600-400 MG-UNIT per tablet   conjugated estrogens (PREMARIN) cream   fish oil-omega-3 fatty acids 1000 MG capsule   ibuprofen (ADVIL/MOTRIN) 200 MG tablet   MAGNESIUM OXIDE PO   Multiple Vitamin (MULTI-VITAMINS) TABS   propranolol (INDERAL) 20 MG tablet   rizatriptan (MAXALT-MLT) 10 MG ODT tab[TV1.2]         Review of Systems   Constitutional: Negative for chills and fever.   HENT: Negative for congestion.    Eyes: Negative for visual disturbance.   Respiratory: Negative for chest tightness and shortness of breath.    Cardiovascular: Negative for chest pain.   Gastrointestinal: Negative for nausea and vomiting.   Genitourinary: Negative for dysuria.   Musculoskeletal: Negative for arthralgias.   Skin: Positive for wound. Negative for rash.   Neurological: Negative for light-headedness.   Psychiatric/Behavioral: Negative for agitation.       Physical Exam[TV1.1]   BP: 148/90  Pulse: 58  Temp: 97.2  F (36.2  C)  Resp: 16  Height: 175.3 cm (5' 9\")  Weight: 103.4 kg (228 lb)  SpO2: 98 %[TV1.2]      Physical Exam   Constitutional: She " is oriented to person, place, and time. She appears well-developed and well-nourished. No distress.   HENT:   Head: Normocephalic.   She has some swelling in the left upper forehead with a small 1 cm superficial laceration that is oozing a small amount of blood.   Eyes: Conjunctivae and EOM are normal. Pupils are equal, round, and reactive to light.   Neck: Neck supple.   Cardiovascular: Normal rate.    Pulmonary/Chest: Effort normal.   Neurological: She is alert and oriented to person, place, and time. She has normal strength. GCS eye subscore is 4. GCS verbal subscore is 5. GCS motor subscore is 6.   CN 2-12 intact   Skin: Skin is warm and dry. She is not diaphoretic.   Psychiatric: She has a normal mood and affect. Her behavior is normal.   Nursing note and vitals reviewed.      ED Course[TV1.1]     ED Course[TV1.2]     Procedures[TV1.1]      No results found for this or any previous visit (from the past 24 hour(s)).    Medications   bacitracin ointment 1 g (not administered)[TV1.2]       Assessments & Plan (with Medical Decision Making)     I have reviewed the nursing notes.    I have reviewed the findings, diagnosis, plan and need for follow up with the patient.  I do not see any signs of closed head injury at this time, however she certainly could develop something in the near future. We discussed signs and symptoms of closed head injury and that she should watch for these for the next 48 hours. The laceration does not require repair and a Band-Aid will be placed over this. She should return if she is feeling worse, otherwise okay to return to normal activity.[TV1.1]    New Prescriptions    No medications on file       Final diagnoses:   Fall, initial encounter   Closed head injury, initial encounter   Laceration of forehead, initial encounter[TV1.2]       5/15/2018   [facility][TV1.1]     Sergio Phillip MD  05/15/18 0944  [TV1.2]     Revision History        User Key Date/Time User  Provider Type Action    > TV1.2 5/15/2018  9:44 AM Sergio Phillip MD Physician Sign     TV1.1 5/15/2018  9:40 AM Sergio Phillip MD Physician                   Procedure Notes     No notes of this type exist for this encounter.      Progress Notes - Therapies (Notes from 05/12/18 through 05/15/18)     No notes of this type exist for this encounter.

## 2018-05-15 NOTE — ED TRIAGE NOTES
"ED Nursing Triage Note (General)   ________________________________    Nicole Perera is a 57 year old Female that presents to triage ambulatory.  With history of being at work and walking and slipped on floor and fell foreward hitting head door frame she thinks and then fell onto knees reported by patient   Significant symptoms had onset 10 minute(s) ago.  /90  Pulse 58  Temp 97.2  F (36.2  C) (Tympanic)  Resp 16  Ht 1.753 m (5' 9\")  Wt 103.4 kg (228 lb)  SpO2 98%  BMI 33.67 kg/m2t  Patient appears alert  and oriented, in moderate distress., and cooperative, pleasant and calm behavior.  GCS Total = 15  Airway: intact  Breathing noted as Normal and non labored.  Circulation Normal with  Skin normal, warm, dry  Action taken:  Triage to critical care immediately in bay 3 and settled onto cart.      PRE HOSPITAL PRIOR LIVING SITUATION Spouse  "

## 2018-05-15 NOTE — DISCHARGE INSTRUCTIONS
Coping with Concussion  Concussion is also known as mild traumatic brain injury (MTBI). It is often caused by a blow to the head, or a fall. You may have been unconscious for a few seconds or minutes after the injury. Or maybe you were dazed, confused, or  saw stars.  After this, you thought you were OK. Now, weeks or months later, you re having symptoms that may be caused by a concussion. The good news is that, in most people,  these symptoms will likely go away on their own. Most people with a concussion recover fully, with no need for treatment.     A cold compress can help relieve a headache.    What is a concussion?  A concussion is a mild form of brain injury. In some cases, the effects of a concussion go away within days of the injury. In others, symptoms may continue for a few months. Fortunately, a concussion is temporary. Even when symptoms stay for months, they do go away over time. If they don't, or if your symptoms are worse, contact your healthcare provider.  Symptoms of a concussion  You may have noticed some of these symptoms:    Headaches    Irritability and other changes in behavior    Problems remembering or concentrating    Dizziness or lack of coordination    Fatigue    Problems sleeping    Sensitivity to light and sound    Vision changes  NOTE: If you have severe symptoms or trouble functioning, talk with your healthcare provider right away. If you had a more serious head injury than a concussion, you likely need treatment. Be sure to see your healthcare provider for an evaluation.   What you can do  Since the effects of a concussion go away over time, there isn t a lot you need to do. Be assured that this problem is temporary. You ll likely have a full recovery. In the meantime, talk with your healthcare provider about ways to relieve any symptoms that are bothering you. These tips may help:    Don't return to sports or any activity that could cause you to hit your head until all symptoms  are gone and you have been cleared by your doctor. A second head injury before fully recovering from the first one can lead to serious brain injury.    Return to normal activities of daily living and normal social interaction is encouraged to speed recovery.    Stress can make symptoms worse. Help calm yourself by resting in a quiet place and imagining a peaceful scene. Relax your muscles by soaking in a hot bath or taking a hot shower.    Take over-the-counter  acetaminophen to relieve headache pain. Take them as directed on the package. Don't take ibuprofen or aspirin after a head injury.    If you become dizzy, sit or lie down in a safe place until the sensation passes. Don t drive when you feel dizzy or disoriented.    If you re having trouble sleeping, try to keep a regular sleep schedule. Go to bed and get up at the same time each day. Avoid or limit caffeine and nicotine. Also don't drink alcohol. It may help you sleep at first, but your sleep will not be restful.    Give yourself time to heal. Your recovery will take some time. When you have symptoms, remember that you won t feel this way forever. In time the symptoms will go away and you ll be back to yourself.  If you re not feeling better  The effects of a concussion often go away in 7 to 10 days and the vast majority of people who have had a concussion have recovered after 3 months. If you re not feeling better as time passes, there may be something else going on. If your symptoms don t go away or you notice new ones, talk with your healthcare provider. He or she can help you get the treatment you need.   Date Last Reviewed: 1/1/2018 2000-2017 [employer]. [address] [place] [address]. All rights reserved. This information is not intended as a substitute for professional medical care. Always follow your healthcare professional's instructions.

## 2018-05-15 NOTE — ED AVS SNAPSHOT
[facility]    [address]    [place] [address]    Phone:  [phone]    Fax:  [phone]                                       Nicole Perera   MRN: [id number]    Department:  [facility]   Date of Visit:  5/15/2018           Patient Information     Date Of Birth          1960        Your diagnoses for this visit were:     Fall, initial encounter     Closed head injury, initial encounter     Laceration of forehead, initial encounter        You were seen by Sergio Phillip MD.        Discharge Instructions         Coping with Concussion  Concussion is also known as mild traumatic brain injury (MTBI). It is often caused by a blow to the head, or a fall. You may have been unconscious for a few seconds or minutes after the injury. Or maybe you were dazed, confused, or  saw stars.  After this, you thought you were OK. Now, weeks or months later, you re having symptoms that may be caused by a concussion. The good news is that, in most people,  these symptoms will likely go away on their own. Most people with a concussion recover fully, with no need for treatment.     A cold compress can help relieve a headache.    What is a concussion?  A concussion is a mild form of brain injury. In some cases, the effects of a concussion go away within days of the injury. In others, symptoms may continue for a few months. Fortunately, a concussion is temporary. Even when symptoms stay for months, they do go away over time. If they don't, or if your symptoms are worse, contact your healthcare provider.  Symptoms of a concussion  You may have noticed some of these symptoms:    Headaches    Irritability and other changes in behavior    Problems remembering or concentrating    Dizziness or lack of coordination    Fatigue    Problems sleeping    Sensitivity to light and sound    Vision changes  NOTE: If you have severe symptoms or trouble functioning, talk with your  healthcare provider right away. If you had a more serious head injury than a concussion, you likely need treatment. Be sure to see your healthcare provider for an evaluation.   What you can do  Since the effects of a concussion go away over time, there isn t a lot you need to do. Be assured that this problem is temporary. You ll likely have a full recovery. In the meantime, talk with your healthcare provider about ways to relieve any symptoms that are bothering you. These tips may help:    Don't return to sports or any activity that could cause you to hit your head until all symptoms are gone and you have been cleared by your doctor. A second head injury before fully recovering from the first one can lead to serious brain injury.    Return to normal activities of daily living and normal social interaction is encouraged to speed recovery.    Stress can make symptoms worse. Help calm yourself by resting in a quiet place and imagining a peaceful scene. Relax your muscles by soaking in a hot bath or taking a hot shower.    Take over-the-counter  acetaminophen to relieve headache pain. Take them as directed on the package. Don't take ibuprofen or aspirin after a head injury.    If you become dizzy, sit or lie down in a safe place until the sensation passes. Don t drive when you feel dizzy or disoriented.    If you re having trouble sleeping, try to keep a regular sleep schedule. Go to bed and get up at the same time each day. Avoid or limit caffeine and nicotine. Also don't drink alcohol. It may help you sleep at first, but your sleep will not be restful.    Give yourself time to heal. Your recovery will take some time. When you have symptoms, remember that you won t feel this way forever. In time the symptoms will go away and you ll be back to yourself.  If you re not feeling better  The effects of a concussion often go away in 7 to 10 days and the vast majority of people who have had a concussion have recovered after 3  months. If you re not feeling better as time passes, there may be something else going on. If your symptoms don t go away or you notice new ones, talk with your healthcare provider. He or she can help you get the treatment you need.   Date Last Reviewed: 1/1/2018 2000-2017 [employer]. [address] [place] [address], [place] [address]. All rights reserved. This information is not intended as a substitute for professional medical care. Always follow your healthcare professional's instructions.          24 Hour Appointment Hotline       To make an appointment at any [facility], call [phone] ([phone]). If you don't have a family doctor or clinic, we will help you find one. [facility] clinics are conveniently located to serve the needs of you and your family.             Review of your medicines      Our records show that you are taking the medicines listed below. If these are incorrect, please call your family doctor or clinic.        Dose / Directions Last dose taken    aspirin 81 MG EC tablet   Dose:  81 mg        Take 81 mg by mouth daily   Refills:  0        calcium-vitamin D 600-400 MG-UNIT per tablet   Commonly known as:  CALTRATE   Dose:  1 tablet        Take 1 tablet by mouth 2 times daily (with meals)   Refills:  0        conjugated estrogens cream   Commonly known as:  PREMARIN        daily   Refills:  0        fish oil-omega-3 fatty acids 1000 MG capsule   Dose:  1 capsule        Take 1 capsule by mouth daily   Refills:  0        ibuprofen 200 MG tablet   Commonly known as:  ADVIL/MOTRIN   Dose:  600 mg        Take 600 mg by mouth every 6 hours as needed for pain   Refills:  0        MAGNESIUM OXIDE PO   Dose:  400 mg        Take 400 mg by mouth   Refills:  0        MULTI-VITAMINS Tabs   Dose:  1 tablet        Take 1 tablet by mouth daily   Refills:  0        propranolol 20 MG tablet   Commonly known as:  INDERAL   Dose:  20 mg   Quantity:  90 tablet        Take 1 tablet (20 mg) by  mouth daily   Refills:  3        rizatriptan 10 MG ODT tab   Commonly known as:  MAXALT-MLT   Dose:  10 mg   Quantity:  10 tablet        Take 1 tablet (10 mg) by mouth as needed   Refills:  11                Orders Needing Specimen Collection     None      Pending Results     No orders found from 5/13/2018 to 5/16/2018.            Pending Culture Results     No orders found from 5/13/2018 to 5/16/2018.            Pending Results Instructions     If you had any lab results that were not finalized at the time of your Discharge, you can call the ED Lab Result RN at [phone]. You will be contacted by this team for any positive Lab results or changes in treatment. The nurses are available 7 days a week from 10A to 6:30P.  You can leave a message 24 hours per day and they will return your call.        Thank you for choosing [facility]       Thank you for choosing [facility] for your care. Our goal is always to provide you with excellent care. Hearing back from our patients is one way we can continue to improve our services. Please take a few minutes to complete the written survey that you may receive in the mail after you visit with us. Thank you!        [facility] Information     [employer] gives you secure access to your electronic health record. If you see a primary care provider, you can also send messages to your care team and make appointments. If you have questions, please call your primary care clinic.  If you do not have a primary care provider, please call [phone] and they will assist you.        Care EveryWhere ID     This is your Care EveryWhere ID. This could be used by other organizations to access your [facility] medical records  [id number]        Equal Access to Services     [employer] LIZARRAGA : Geoff griffiths Sogin, waaxda luqadaha, qaybta kaalmada aderadha, dee campos. So [facility] [phone].    ATENCIÓN: Si habla español, tiene a redd disposición servicios gratuitos de  asistencia lingüística. Saturnino al [phone].    We comply with applicable federal civil rights laws and [place] laws. We do not discriminate on the basis of race, color, national origin, age, disability, sex, sexual orientation, or gender identity.            After Visit Summary       This is your record. Keep this with you and show to your community pharmacist(s) and doctor(s) at your next visit.

## 2018-05-16 ENCOUNTER — APPOINTMENT (OUTPATIENT)
Dept: GENERAL RADIOLOGY | Facility: OTHER | Age: 58
End: 2018-05-16
Attending: FAMILY MEDICINE
Payer: OTHER MISCELLANEOUS

## 2018-05-16 ENCOUNTER — HOSPITAL ENCOUNTER (EMERGENCY)
Facility: OTHER | Age: 58
Discharge: HOME | End: 2018-05-16
Attending: FAMILY MEDICINE | Admitting: FAMILY MEDICINE
Payer: OTHER MISCELLANEOUS

## 2018-05-16 VITALS
BODY MASS INDEX: 33.47 KG/M2 | RESPIRATION RATE: 14 BRPM | TEMPERATURE: 98 F | DIASTOLIC BLOOD PRESSURE: 75 MMHG | HEIGHT: 69 IN | OXYGEN SATURATION: 97 % | WEIGHT: 226 LBS | SYSTOLIC BLOOD PRESSURE: 132 MMHG

## 2018-05-16 DIAGNOSIS — S93.602A FOOT SPRAIN, LEFT, INITIAL ENCOUNTER: ICD-10-CM

## 2018-05-16 PROCEDURE — 99282 EMERGENCY DEPT VISIT SF MDM: CPT | Mod: Z6 | Performed by: FAMILY MEDICINE

## 2018-05-16 PROCEDURE — 73630 X-RAY EXAM OF FOOT: CPT | Mod: LT

## 2018-05-16 PROCEDURE — 99283 EMERGENCY DEPT VISIT LOW MDM: CPT | Mod: 25 | Performed by: FAMILY MEDICINE

## 2018-05-16 ASSESSMENT — ENCOUNTER SYMPTOMS
FEVER: 0
CHILLS: 0
CHOKING: 0
PHOTOPHOBIA: 0

## 2018-05-16 NOTE — ED TRIAGE NOTES
"ED Nursing Triage Note (General)   ________________________________    Nicole Perera is a 57 year old Female that presents to triage private car  With history of  Fall at work (here) yesterday and today is experiencing pain at the side of left foot with bruise there reported by patient   Significant symptoms had onset 24 hour(s) ago.  /75  Temp 98  F (36.7  C) (Temporal)  Resp 14  Ht 1.753 m (5' 9\")  Wt 102.5 kg (226 lb)  SpO2 97%  Breastfeeding? No  BMI 33.37 kg/m2t  Patient appears alert , in moderate distress., and cooperative behavior.    GCS Total = 15  Airway: intact  Breathing noted as Normal.  Circulation Normal  Skin normal  Action taken:  Triage order initiated      PRE HOSPITAL PRIOR LIVING SITUATION Spouse    COLUMBIA-SUICIDE SEVERITY RATING SCALE   Screen with Triage Points for Emergency Department      Ask questions that are bolded and underlined.   Past  month   Ask Questions 1 and 2 YES NO   1)  Have you wished you were dead or wished you could go to sleep and not wake up?   no   2)  Have you actually had any thoughts of killing yourself?   no   If YES to 2, ask questions 3, 4, 5, and 6.  If NO to 2, go directly to question 6.   3)  Have you been thinking about how you might do this?   E.g.  I thought about taking an overdose but I never made a specific plan as to when where or how I would actually do it .and I would never go through with it.       4)  Have you had these thoughts and had some intention of acting on them?   As opposed to  I have the thoughts but I definitely will not do anything about them.       5)  Have you started to work out or worked out the details of how to kill yourself? Do you intend to carry out this plan?      6)  Have you ever done anything, started to do anything, or prepared to do anything to end your life?  Examples: Collected pills, obtained a gun, gave away valuables, wrote a will or suicide note, took out pills but didn t swallow any, held a gun but " changed your mind or it was grabbed from your hand, went to the roof but didn t jump; or actually took pills, tried to shoot yourself, cut yourself, tried to hang yourself, etc.    If YES, ask: Was this within the past three months?  Lifetime     no    Past 3 Months       no   Item 1:  Behavioral Health Referral at Discharge  Item 2:  Behavioral Health Referral at Discharge   Item 3:  Behavioral Health Consult (Psychiatric Nurse/) and consider Patient Safety Precautions  Item 4:  Immediate Notification of Physician and/or Behavioral Health and Patient Safety Precautions   Item 5:  Immediate Notification of Physician and/or Behavioral Health and Patient Safety Precautions  Item 6:  Over 3 months ago: Behavioral Health Consult (Psychiatric Nurse/) and consider Patient Safety Precautions  OR  Item 6:  3 months ago or less: Immediate Notification of Physician and/or Behavioral Health and Patient Safety Precautions

## 2018-05-16 NOTE — ED AVS SNAPSHOT
[facility]    [address] [facility] [address]    [place] [address]    Phone:  [phone]    Fax:  [phone]                                       Nicole Perera   MRN: [id number]    Department:  [facility]   Date of Visit:  5/16/2018           Patient Information     Date Of Birth          1960        Your diagnoses for this visit were:     Foot sprain, left, initial encounter        You were seen by Rk Rob MD.      Follow-up Information     Follow up with Lillie Saldaña MD.    Specialty:  Family Practice    Why:  As needed    Contact information:    [address] [facility] [address] [phone]          Follow up with [facility].    Specialty:  EMERGENCY MEDICINE    Contact information:    [address] [facility] [address]  [facility] [place] [facility] [address] [phone]        Discharge Instructions         Self-Care for Strains and Sprains  Most minor strains and sprains can be treated with self-care. Recovering from a strain or sprain may take 6 to 8 weeks. Your self-care goal is to reduce pain and immobilize the injury to speed healing.     A sprain injures ligaments (tissue that connects bones to bones).      A strain injures muscles or tendons (tissue that connects muscles to bones).   Support the injured area  Wrapping the injured area provides support for short, necessary activities. Be careful not to wrap the area too tightly. This could cut off the blood supply.    Support a wrist, elbow, or shoulder with a sling.    Wrap an ankle or knee with an elastic bandage.    Tape a finger or toe to the one next to it.  Use cold and heat  Cold reduces swelling. Both cold and heat reduce pain. Heat should not be used in the initial treatment of the injury. When using cold or heat, always place a towel between the pack and your skin.    Apply ice or a cold pack 10 to 15 minutes every hour you re awake for the first 2  days.    After the swelling goes down, use cold or heat to control pain. Don t use heat late in the day, since it can cause swelling when you re not active.  Rest and elevate  Rest and elevation help your injury heal faster.    Raise the injured area above your heart level.    Keep the injured area from moving.    Limit the use of the joint or limb.  Use medicine    Aspirin reduces pain and swelling. (Note: Don t give aspirin to a child 18 or younger unless prescribed by the doctor.)    Aspirin substitutes, such as ibuprofen, can reduce pain. Some substitutes reduce swelling, too. Ask your pharmacist which substitutes you can use.  Call your doctor if:    The injured joint won t move, or bones make a grating sound when they move.    You can t put weight on the injured area, even after 24 hours.    The injured body part is cold, blue, or numb.    The joint or limb appears bent or crooked.    Pain increases or doesn t improve in 4 days.    When pressing along the injured area, you notice a spot that is especially painful.   Date Last Reviewed: 9/29/2015 2000-2017 [employer]. [address] [place] [address]. All rights reserved. This information is not intended as a substitute for professional medical care. Always follow your healthcare professional's instructions.          24 Hour Appointment Hotline       To make an appointment at any [facility] clinic, call [phone] ([phone]). If you don't have a family doctor or clinic, we will help you find one. [facility] clinics are conveniently located to serve the needs of you and your family.             Review of your medicines      Our records show that you are taking the medicines listed below. If these are incorrect, please call your family doctor or clinic.        Dose / Directions Last dose taken    aspirin 81 MG EC tablet   Dose:  81 mg        Take 81 mg by mouth daily   Refills:  0        calcium-vitamin D 600-400 MG-UNIT per  tablet   Commonly known as:  CALTRATE   Dose:  1 tablet        Take 1 tablet by mouth 2 times daily (with meals)   Refills:  0        conjugated estrogens cream   Commonly known as:  PREMARIN        daily   Refills:  0        fish oil-omega-3 fatty acids 1000 MG capsule   Dose:  1 capsule        Take 1 capsule by mouth daily   Refills:  0        ibuprofen 200 MG tablet   Commonly known as:  ADVIL/MOTRIN   Dose:  600 mg        Take 600 mg by mouth every 6 hours as needed for pain   Refills:  0        MAGNESIUM OXIDE PO   Dose:  400 mg        Take 400 mg by mouth   Refills:  0        MULTI-VITAMINS Tabs   Dose:  1 tablet        Take 1 tablet by mouth daily   Refills:  0        propranolol 20 MG tablet   Commonly known as:  INDERAL   Dose:  20 mg   Quantity:  90 tablet        Take 1 tablet (20 mg) by mouth daily   Refills:  3        rizatriptan 10 MG ODT tab   Commonly known as:  MAXALT-MLT   Dose:  10 mg   Quantity:  10 tablet        Take 1 tablet (10 mg) by mouth as needed   Refills:  11                Procedures and tests performed during your visit     XR Foot Left G/E 3 Views      Orders Needing Specimen Collection     None      Pending Results     No orders found from 5/14/2018 to 5/17/2018.            Pending Culture Results     No orders found from 5/14/2018 to 5/17/2018.            Pending Results Instructions     If you had any lab results that were not finalized at the time of your Discharge, you can call the ED Lab Result RN at [phone]. You will be contacted by this team for any positive Lab results or changes in treatment. The nurses are available 7 days a week from 10A to 6:30P.  You can leave a message 24 hours per day and they will return your call.        Thank you for choosing [facility]       Thank you for choosing [facility] for your care. Our goal is always to provide you with excellent care. Hearing back from our patients is one way we can continue to improve our services. Please take a few  minutes to complete the written survey that you may receive in the mail after you visit with us. Thank you!        [employer] Information     [employer] gives you secure access to your electronic health record. If you see a primary care provider, you can also send messages to your care team and make appointments. If you have questions, please call your primary care clinic.  If you do not have a primary care provider, please call [phone] and they will assist you.        Care EveryWhere ID     This is your Care EveryWhere ID. This could be used by other organizations to access your [facility] medical records  [id number]        Equal Access to Services     [facility] : Geoff Flores, blanca gan, jc mendez, dee campos. So [facility] [phone].    ATENCIÓN: Si habla español, tiene a redd disposición servicios gratuitos de asistencia lingüística. Llame al [phone].    We comply with applicable federal civil rights laws and [place] laws. We do not discriminate on the basis of race, color, national origin, age, disability, sex, sexual orientation, or gender identity.            After Visit Summary       This is your record. Keep this with you and show to your community pharmacist(s) and doctor(s) at your next visit.

## 2018-05-16 NOTE — ED PROVIDER NOTES
"  History   No chief complaint on file.    HPI  Nicole Perera is a 57 year old female who is the emergency department with left foot pain.  sHe fell yesterday while at work.  Sore on the outside of her left foot.  She is able to bear weight but it hurts.  Reviewed nurse's notes below, similar history related to me.  Should also hit her head in the fall, she had no loss of consciousness.  She had a small bruise on her left upper or head, a small abraded area did bleed a little and she applied a Band-Aid to the area.  No headache today no visual changes.    Nicole Perera is a 57 year old Female that presents to triage private car  With history of  Fall at work (here) yesterday and today is experiencing pain at the side of left foot with bruise there reported by patient   Significant symptoms had onset 24 hour(s) ago.  /75  Temp 98  F (36.7  C) (Temporal)  Resp 14  Ht 1.753 m (5' 9\")  Wt 102.5 kg (226 lb)  SpO2 97%  Breastfeeding? No  BMI 33.37 kg/m2t  Patient appears   Problem List:    Patient Active Problem List    Diagnosis Date Noted     Fibrocystic breast disease 2018     Priority: Medium     Migraine headache 2018     Priority: Medium     Dyspareunia (CODE) 2015     Priority: Medium     Rosacea 2015     Priority: Medium        Past Medical History:    Past Medical History:   Diagnosis Date     Abnormal cytological finding in specimen from cervix uteri      Contact with and suspected exposure to communicable disease      Personal history of other medical treatment (CODE)      Personal history of other medical treatment (CODE)        Past Surgical History:    Past Surgical History:   Procedure Laterality Date     APPENDECTOMY OPEN      No Comments Provided     BIOPSY BREAST      No Comments Provided      SECTION      ,Rt breast biopsy for fibrocystic breast disease~     COLONOSCOPY      2010,Colonoscopy-  normal  Next due .     ESOPHAGOSCOPY, " "GASTROSCOPY, DUODENOSCOPY (EGD), COMBINED      07,Esophagogastroduodenoscopy       Family History:    Family History   Problem Relation Age of Onset     HEART DISEASE Father 50     Heart Disease     Other - See Comments Father      Stroke     Other - See Comments Maternal Aunt      Migraines     Thyroid Disease Mother      Thyroid Disease,Thyroid disorder -after being stepped on by a cow     DIABETES Mother      Diabetes     DIABETES Brother      Diabetes     Other - See Comments Sister      CP     Substance Abuse Brother 44     Alcohol/Drug, ETOHism-       Social History:  Marital Status:   [2]  Social History   Substance Use Topics     Smoking status: Never Smoker     Smokeless tobacco: Never Used     Alcohol use 0.6 oz/week      Comment: occasional 1/week        Medications:      aspirin EC 81 MG EC tablet   calcium-vitamin D (CALTRATE) 600-400 MG-UNIT per tablet   fish oil-omega-3 fatty acids 1000 MG capsule   ibuprofen (ADVIL/MOTRIN) 200 MG tablet   MAGNESIUM OXIDE PO   Multiple Vitamin (MULTI-VITAMINS) TABS   propranolol (INDERAL) 20 MG tablet   rizatriptan (MAXALT-MLT) 10 MG ODT tab   conjugated estrogens (PREMARIN) cream         Review of Systems   Constitutional: Negative for chills and fever.   HENT: Negative for congestion.    Eyes: Negative for photophobia.   Respiratory: Negative for choking.    Endocrine: Negative for polyuria.       Physical Exam   BP: 132/75  Heart Rate: 57  Temp: 98  F (36.7  C)  Resp: 14  Height: 175.3 cm (5' 9\")  Weight: 102.5 kg (226 lb)  SpO2: 97 %      Physical Exam  Alert and oriented ×3, no distress, no respiratory distress, no knee tenderness or proximal fibular tenderness.  Calf squeeze test is negative.  Lucas ankle and foot rolls are negative.  ED Course     ED Course     Procedures               Results for orders placed or performed during the hospital encounter of 18 (from the past 24 hour(s))   XR Foot Left G/E 3 Views    Narrative    " PROCEDURE:  XR FOOT LT G/E 3 VW    HISTORY: Trauma.    COMPARISON:  None.    TECHNIQUE:  3 views left foot.    FINDINGS:  No fracture or dislocation is identified. Mild scattered  generative changes are present. A navicular ossicle is present. No  foreign body is seen. Plantar calcaneal spurring is seen.      Impression    IMPRESSION: No acute fracture.      BREANNA DING MD       Medications - No data to display    Assessments & Plan (with Medical Decision Making)     New Prescriptions    No medications on file       Final diagnoses:   Foot sprain, left, initial encounter     Rest ce elevation, recommend better footwear, recommend super feet foot inserts.  Follow-up in 2-3 weeks if pain continues earlier with worsening symptoms.  Low clinical suspicion for occult fracture, low risk mechanism of injury.  Small abrasion to her forehead, watchful waiting return with signs or symptoms of infection, at this point adequate treatment has been applied.  5/16/2018   [facility]  05/16/18 1912

## 2018-05-16 NOTE — DISCHARGE INSTRUCTIONS
Self-Care for Strains and Sprains  Most minor strains and sprains can be treated with self-care. Recovering from a strain or sprain may take 6 to 8 weeks. Your self-care goal is to reduce pain and immobilize the injury to speed healing.     A sprain injures ligaments (tissue that connects bones to bones).      A strain injures muscles or tendons (tissue that connects muscles to bones).   Support the injured area  Wrapping the injured area provides support for short, necessary activities. Be careful not to wrap the area too tightly. This could cut off the blood supply.    Support a wrist, elbow, or shoulder with a sling.    Wrap an ankle or knee with an elastic bandage.    Tape a finger or toe to the one next to it.  Use cold and heat  Cold reduces swelling. Both cold and heat reduce pain. Heat should not be used in the initial treatment of the injury. When using cold or heat, always place a towel between the pack and your skin.    Apply ice or a cold pack 10 to 15 minutes every hour you re awake for the first 2 days.    After the swelling goes down, use cold or heat to control pain. Don t use heat late in the day, since it can cause swelling when you re not active.  Rest and elevate  Rest and elevation help your injury heal faster.    Raise the injured area above your heart level.    Keep the injured area from moving.    Limit the use of the joint or limb.  Use medicine    Aspirin reduces pain and swelling. (Note: Don t give aspirin to a child 18 or younger unless prescribed by the doctor.)    Aspirin substitutes, such as ibuprofen, can reduce pain. Some substitutes reduce swelling, too. Ask your pharmacist which substitutes you can use.  Call your doctor if:    The injured joint won t move, or bones make a grating sound when they move.    You can t put weight on the injured area, even after 24 hours.    The injured body part is cold, blue, or numb.    The joint or limb appears bent or crooked.    Pain increases  or doesn t improve in 4 days.    When pressing along the injured area, you notice a spot that is especially painful.   Date Last Reviewed: 9/29/2015 2000-2017 [employer]. [address] [place] [address], [place] [address]. All rights reserved. This information is not intended as a substitute for professional medical care. Always follow your healthcare professional's instructions.

## 2018-05-16 NOTE — ED AVS SNAPSHOT
[facility]    [address]    [place] [address]    Phone:  [phone]    Fax:  [phone]                                       Nicole Perera   MRN: [id number]    Department:  [facility]   Date of Visit:  5/16/2018           After Visit Summary Signature Page     I have received my discharge instructions, and my questions have been answered. I have discussed any challenges I see with this plan with the nurse or doctor.    ..........................................................................................................................................  Patient/Patient Representative Signature      ..........................................................................................................................................  Patient Representative Print Name and Relationship to Patient    ..................................................               ................................................  Date                                            Time    ..........................................................................................................................................  Reviewed by Signature/Title    ...................................................              ..............................................  Date                                                            Time

## 2018-06-01 ENCOUNTER — TELEPHONE (OUTPATIENT)
Dept: FAMILY MEDICINE | Facility: OTHER | Age: 58
End: 2018-06-01

## 2018-06-01 NOTE — TELEPHONE ENCOUNTER
Patient will be seen at 10:15 on Tuesday for follow up work comp Katina Boogie LPN ....................6/1/2018  8:55 AM

## 2018-06-05 ENCOUNTER — OFFICE VISIT (OUTPATIENT)
Dept: FAMILY MEDICINE | Facility: OTHER | Age: 58
End: 2018-06-05
Attending: FAMILY MEDICINE
Payer: OTHER MISCELLANEOUS

## 2018-06-05 VITALS
HEIGHT: 69 IN | BODY MASS INDEX: 34.36 KG/M2 | HEART RATE: 74 BPM | WEIGHT: 232 LBS | SYSTOLIC BLOOD PRESSURE: 126 MMHG | DIASTOLIC BLOOD PRESSURE: 80 MMHG

## 2018-06-05 DIAGNOSIS — S01.81XD LACERATION OF FOREHEAD, SUBSEQUENT ENCOUNTER: ICD-10-CM

## 2018-06-05 DIAGNOSIS — S06.0X0D CONCUSSION WITHOUT LOSS OF CONSCIOUSNESS, SUBSEQUENT ENCOUNTER: Primary | ICD-10-CM

## 2018-06-05 DIAGNOSIS — S93.602D FOOT SPRAIN, LEFT, SUBSEQUENT ENCOUNTER: ICD-10-CM

## 2018-06-05 PROCEDURE — 99213 OFFICE O/P EST LOW 20 MIN: CPT | Performed by: FAMILY MEDICINE

## 2018-06-05 RX ORDER — BIOTIN 10000 MCG
CAPSULE ORAL
COMMUNITY
End: 2019-05-03

## 2018-06-05 ASSESSMENT — ENCOUNTER SYMPTOMS
VOMITING: 0
NAUSEA: 0
FEVER: 0
HEADACHES: 1

## 2018-06-05 ASSESSMENT — PAIN SCALES - GENERAL: PAINLEVEL: MILD PAIN (2)

## 2018-06-05 NOTE — NURSING NOTE
Patient is here for her work comp follow up . Gwendolyn Boogie LPN ....................6/5/2018  10:13 AM

## 2018-06-05 NOTE — LETTER
June 5, 2018      Nicole Perera  [address] [facility] [address]        To Whom It May Concern,      Nicole had a work related injury on 5/15/18 involving a concussion.  By Thursday of her work weeks since this injury, she has developed significant headaches.  I would request that she just work mornings (up to 4 hours per day) through June 13th to allow for increased rest to speed her recovery from her concussion.      Sincerely,        Lillie Saldaña MD

## 2018-06-05 NOTE — PROGRESS NOTES
SUBJECTIVE:   Nursing Notes:   Keagan Gwendolyn SANZ., LPN  6/5/2018 10:15 AM  Signed  Patient is here for her work comp follow up . Gwendolyn Keagan KAISERN ....................6/5/2018  10:13 AM      Nicole Perera is a 57 year old female who presents to clinic today for follow up of ER visits on 5/15 and 5/16/18.  On 5/15/18, she had slipped and fell in a hallway at work.  She fell to her knees and hit the left side of her forehead on a door jam.  She did not have any loss of consciousness.  She had swelling and a small laceration of her head.  The following day, she returned to the Emergency Department with a complaint of left foot pain.  She was able to bear weight, but there was some pain with it.  X-ray of her foot at that time was negative for fracture.  She was felt to have had a sprain at that time.  She was off of work the rest of 5/15, but was back to work the next day.  Since then, she continues to have ringing in her ears.  Has pain radiating to the back of her head, which she feels is ot her usual migraine.  The headache gets quite severe at times.  Rest helps.  Also gets a prickly pain at the site she hit her head on the left frontal area.  She has not had any imaging at this time.  Her forehead is still very tender, but less painful than it had been.  Hasn't had a migraine since this happened.  Feels irritable at times.  By Thursday and Friday, she feels like her headaches are worse and more irritable.  The headaches have been severe enough that she has had to leave work early.  Her left foot feels back to normal at this time.    HPI    I personally reviewed medications/allergies/history listed below:    Patient Active Problem List    Diagnosis Date Noted     Fibrocystic breast disease 02/13/2018     Priority: Medium     Migraine headache 02/13/2018     Priority: Medium     Dyspareunia (CODE) 02/16/2015     Priority: Medium     Rosacea 02/16/2015     Priority: Medium     Past Medical History:   Diagnosis  Date     Abnormal cytological finding in specimen from cervix uteri     Hx of abnormal Pap with atypical squamous colp  showed benign reactive  epithelial changes, subsequent Paps have been normal.     Contact with and suspected exposure to communicable disease     Bat bite, left side of neck     Personal history of other medical treatment (CODE)          Personal history of other medical treatment (CODE)     HX of abnormal Pap with atypical squamous colp  showed benign reactive epithelial changes, subsequent Paps have been normal      Past Surgical History:   Procedure Laterality Date     APPENDECTOMY OPEN      No Comments Provided     BIOPSY BREAST      No Comments Provided      SECTION      ,Rt breast biopsy for fibrocystic breast disease~     COLONOSCOPY      2010,Colonoscopy-  normal  Next due .     ESOPHAGOSCOPY, GASTROSCOPY, DUODENOSCOPY (EGD), COMBINED      07,Esophagogastroduodenoscopy     Family History   Problem Relation Age of Onset     HEART DISEASE Father 50     Heart Disease     Other - See Comments Father      Stroke     Other - See Comments Maternal Aunt      Migraines     Thyroid Disease Mother      Thyroid Disease,Thyroid disorder -after being stepped on by a cow     DIABETES Mother      Diabetes     DIABETES Brother      Diabetes     Other - See Comments Sister      CP     Substance Abuse Brother 44     Alcohol/Drug, ETOHism-     Social History   Substance Use Topics     Smoking status: Never Smoker     Smokeless tobacco: Never Used     Alcohol use 0.6 oz/week      Comment: occasional 1/week     Social History     Social History Narrative    Does not smoke, guns at home are locked, minimal ETOH, does use sunscreen, uses seat belts 100% of the time.    .  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  Has a degree in health information management.    Arthur - son - attending [employer] in nursing.    Antonio -      Current  "Outpatient Prescriptions   Medication Sig Dispense Refill     aspirin EC 81 MG EC tablet Take 81 mg by mouth daily       Biotin 10 MG CAPS        calcium-vitamin D (CALTRATE) 600-400 MG-UNIT per tablet Take 1 tablet by mouth 2 times daily (with meals)       conjugated estrogens (PREMARIN) cream daily       fish oil-omega-3 fatty acids 1000 MG capsule Take 1 capsule by mouth daily       ibuprofen (ADVIL/MOTRIN) 200 MG tablet Take 600 mg by mouth every 6 hours as needed for pain       MAGNESIUM OXIDE PO Take 400 mg by mouth       Multiple Vitamin (MULTI-VITAMINS) TABS Take 1 tablet by mouth daily       propranolol (INDERAL) 20 MG tablet Take 1 tablet (20 mg) by mouth daily 90 tablet 3     rizatriptan (MAXALT-MLT) 10 MG ODT tab Take 1 tablet (10 mg) by mouth as needed 10 tablet 11     Allergies   Allergen Reactions     Topiramate      Other reaction(s): Dizziness       Review of Systems   Constitutional: Negative for fever.   Gastrointestinal: Negative for nausea and vomiting.   Neurological: Positive for headaches.        OBJECTIVE:     /80 (BP Location: Right arm, Patient Position: Sitting, Cuff Size: Adult Large)  Pulse 74  Ht 5' 9\" (1.753 m)  Wt 232 lb (105.2 kg)  BMI 34.26 kg/m2  Body mass index is 34.26 kg/(m^2).  Physical Exam   Constitutional: She is oriented to person, place, and time. She appears well-developed.   HENT:   Head: Normocephalic.   Right Ear: External ear normal.   Left Ear: External ear normal.   Mouth/Throat: Oropharynx is clear and moist.   Left upper forehead with healed 1 cm laceration.  Still a little swelling surrounding this site.  Ecchymosis under her left eye.   Eyes: Pupils are equal, round, and reactive to light.   Neck: Normal range of motion. Neck supple. No thyromegaly present.   Cardiovascular: Normal rate, regular rhythm and normal heart sounds.    No murmur heard.  Pulmonary/Chest: Effort normal and breath sounds normal. No respiratory distress. She has no wheezes. She " has no rales.   Musculoskeletal:   Left foot: No tenderness over bones of foot noted.  No swelling or ecchymosis noted.   Lymphadenopathy:     She has no cervical adenopathy.   Neurological: She is alert and oriented to person, place, and time. No cranial nerve deficit. She exhibits normal muscle tone. Coordination normal.       PHQ-2 Score:     PHQ-2 ( 1999 [employer]) 6/5/2018 4/3/2018   Q1: Little interest or pleasure in doing things 0 0   Q2: Feeling down, depressed or hopeless 0 0   PHQ-2 Score 0 0       I personally reviewed results withpatient as listed below:   Diagnostic Test Results:  none     ASSESSMENT/PLAN:       ICD-10-CM    1. Concussion without loss of consciousness, subsequent encounter S06.0X0D    2. Laceration of forehead, subsequent encounter S01.81XD    3. Foot sprain, left, subsequent encounter S93.602D        1.  Discussed that her symptoms are consistent with concussion.  Discussed that would recommend rest during times when she is having more headaches.  Discussed that symptoms should improve with time, but may take several weeks.  I gave her a note requesting that she be allowed to work for our days through 6/13/2018.  She will be going on a one-week vacation at that point and will have further opportunity to rest.  If her symptoms are still severe enough to warrant further work modifications, she should contact me.  2.  Laceration appears to be healing well.  No further treatment needed.  3.  Left foot seems to be back to baseline at this time.  She has no further pain currently.  Follow-up as needed.    Lillie Saldaña MD  [facility]

## 2018-06-05 NOTE — MR AVS SNAPSHOT
"              After Visit Summary   6/5/2018    Nicole Perera    MRN: [id number]           Patient Information     Date Of Birth          1960        Visit Information        Provider Department      6/5/2018 10:15 AM Lillie Saldaña MD          Follow-ups after your visit        Who to contact     If you have questions or need follow up information about today's clinic visit or your schedule please contact [facility] directly at [phone].  Normal or non-critical lab and imaging results will be communicated to you by Bababoohart, letter or phone within 4 business days after the clinic has received the results. If you do not hear from us within 7 days, please contact the clinic through SuperTrupert or phone. If you have a critical or abnormal lab result, we will notify you by phone as soon as possible.  Submit refill requests through LineHop or call your pharmacy and they will forward the refill request to us. Please allow 3 business days for your refill to be completed.          Additional Information About Your Visit        MyChart Information     LineHop gives you secure access to your electronic health record. If you see a primary care provider, you can also send messages to your care team and make appointments. If you have questions, please call your primary care clinic.  If you do not have a primary care provider, please call [phone] and they will assist you.        Care EveryWhere ID     This is your Care EveryWhere ID. This could be used by other organizations to access your [facility] medical records  [id number]        Your Vitals Were     Pulse Height BMI (Body Mass Index)             74 5' 9\" (1.753 m) 34.26 kg/m2          Blood Pressure from Last 3 Encounters:   06/05/18 126/80   05/16/18 132/75   05/15/18 122/76    Weight from Last 3 Encounters:   06/05/18 232 lb (105.2 kg)   05/16/18 226 lb (102.5 kg)   05/15/18 228 lb (103.4 kg)    "           Today, you had the following     No orders found for display       Primary Care Provider Office Phone # Fax #    Lillie Concetta Saldaña [phone] [address]  [facility] [address]        Equal Access to Services     JOSEFAMAURICE ELHMA : Geoff agarwal nataliega Sogin, wanicholasda luqadaha, qaybta kaalmada aderadha, dee humphrey elsiejayden mcgrathswathigeovani antunez . So [facility] [phone].    ATENCIÓN: Si habla español, tiene a redd disposición servicios gratuitos de asistencia lingüística. Llame al [phone].    We comply with applicable federal civil rights laws and [place] laws. We do not discriminate on the basis of race, color, national origin, age, disability, sex, sexual orientation, or gender identity.            Thank you!     Thank you for choosing [facility]  for your care. Our goal is always to provide you with excellent care. Hearing back from our patients is one way we can continue to improve our services. Please take a few minutes to complete the written survey that you may receive in the mail after your visit with us. Thank you!             Your Updated Medication List - Protect others around you: Learn how to safely use, store and throw away your medicines at www.disposemymeds.org.          This list is accurate as of 6/5/18 10:49 AM.  Always use your most recent med list.                   Brand Name Dispense Instructions for use Diagnosis    aspirin 81 MG EC tablet      Take 81 mg by mouth daily        Biotin 10 MG Caps           calcium-vitamin D 600-400 MG-UNIT per tablet    CALTRATE     Take 1 tablet by mouth 2 times daily (with meals)        conjugated estrogens cream    PREMARIN     daily        fish oil-omega-3 fatty acids 1000 MG capsule      Take 1 capsule by mouth daily        ibuprofen 200 MG tablet    ADVIL/MOTRIN     Take 600 mg by mouth every 6 hours as needed for pain        MAGNESIUM OXIDE PO      Take 400 mg by mouth         MULTI-VITAMINS Tabs      Take 1 tablet by mouth daily        propranolol 20 MG tablet    INDERAL    90 tablet    Take 1 tablet (20 mg) by mouth daily    Migraine without status migrainosus, not intractable, unspecified migraine type       rizatriptan 10 MG ODT tab    MAXALT-MLT    10 tablet    Take 1 tablet (10 mg) by mouth as needed    Migraine without status migrainosus, not intractable, unspecified migraine type

## 2018-06-28 ENCOUNTER — OFFICE VISIT (OUTPATIENT)
Dept: FAMILY MEDICINE | Facility: OTHER | Age: 58
End: 2018-06-28
Attending: FAMILY MEDICINE
Payer: OTHER MISCELLANEOUS

## 2018-06-28 VITALS
SYSTOLIC BLOOD PRESSURE: 120 MMHG | HEIGHT: 69 IN | WEIGHT: 235 LBS | BODY MASS INDEX: 34.8 KG/M2 | HEART RATE: 72 BPM | DIASTOLIC BLOOD PRESSURE: 80 MMHG

## 2018-06-28 DIAGNOSIS — S06.0X0D CONCUSSION WITHOUT LOSS OF CONSCIOUSNESS, SUBSEQUENT ENCOUNTER: Primary | ICD-10-CM

## 2018-06-28 PROCEDURE — 99213 OFFICE O/P EST LOW 20 MIN: CPT | Performed by: FAMILY MEDICINE

## 2018-06-28 ASSESSMENT — ENCOUNTER SYMPTOMS
COUGH: 0
FATIGUE: 0
DIZZINESS: 0
HEADACHES: 0
ACTIVITY CHANGE: 0
PARESTHESIAS: 1

## 2018-06-28 NOTE — NURSING NOTE
Patient is here for return to work visit. Gwendolyn Boogie LPN ....................6/28/2018  9:04 AM

## 2018-06-28 NOTE — PROGRESS NOTES
SUBJECTIVE:   Nursing Notes:   Gwendolyn Boogie LPN  2018  9:04 AM  Unsigned  Patient is here for return to work visit. Gwendolyn Keagan KAISERN ....................2018  9:04 AM      Nicole Perera is a 57 year old female who presents to clinic today for follow up.  On 5/15/18, she had slipped and fell in a hallway at work.  She fell to her knees and hit the left side of her forehead on a door jam.  She did not have any loss of consciousness. She had swelling and a small laceration of her head. She had been having a lot of fatigue and headaches.  She had been allowed to work 4 hour days at work and this was also followed by a week of vacation.  She returned to work from vacation on 18.  She has been working follow up since then.  She has noticed that she is feeling much better.  Fatigue is better.  Headaches are better.  No longer has radiating pain from her site of impact on her head.  Still has a pins/needles sensation over the place she was hit, but better.    HPI    I personally reviewed medications/allergies/history listed below:    Patient Active Problem List    Diagnosis Date Noted     Fibrocystic breast disease 2018     Priority: Medium     Migraine headache 2018     Priority: Medium     Dyspareunia (CODE) 2015     Priority: Medium     Rosacea 2015     Priority: Medium     Past Medical History:   Diagnosis Date     Abnormal cytological finding in specimen from cervix uteri     Hx of abnormal Pap with atypical squamous colp  showed benign reactive  epithelial changes, subsequent Paps have been normal.     Contact with and suspected exposure to communicable disease     Bat bite, left side of neck     Personal history of other medical treatment (CODE)          Personal history of other medical treatment (CODE)     HX of abnormal Pap with atypical squamous colp  showed benign reactive epithelial changes, subsequent Paps have been normal      Past Surgical  History:   Procedure Laterality Date     APPENDECTOMY OPEN      No Comments Provided     BIOPSY BREAST      No Comments Provided      SECTION      ,Rt breast biopsy for fibrocystic breast disease~     COLONOSCOPY      2010,Colonoscopy-  normal  Next due .     ESOPHAGOSCOPY, GASTROSCOPY, DUODENOSCOPY (EGD), COMBINED      07,Esophagogastroduodenoscopy     Family History   Problem Relation Age of Onset     HEART DISEASE Father 50     Heart Disease     Other - See Comments Father      Stroke     Other - See Comments Maternal Aunt      Migraines     Thyroid Disease Mother      Thyroid Disease,Thyroid disorder -after being stepped on by a cow     Diabetes Mother      Diabetes     Diabetes Brother      Diabetes     Other - See Comments Sister      CP     Substance Abuse Brother 44     Alcohol/Drug, ETOHism-     Social History   Substance Use Topics     Smoking status: Never Smoker     Smokeless tobacco: Never Used     Alcohol use 0.6 oz/week      Comment: occasional 1/week     Social History     Social History Narrative    Does not smoke, guns at home are locked, minimal ETOH, does use sunscreen, uses seat belts 100% of the time.    .  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    Arthur - son - in an RN working in public health.    Antonio -      Current Outpatient Prescriptions   Medication Sig Dispense Refill     aspirin EC 81 MG EC tablet Take 81 mg by mouth daily       Biotin 10 MG CAPS        calcium-vitamin D (CALTRATE) 600-400 MG-UNIT per tablet Take 1 tablet by mouth 2 times daily (with meals)       conjugated estrogens (PREMARIN) cream daily       fish oil-omega-3 fatty acids 1000 MG capsule Take 1 capsule by mouth daily       ibuprofen (ADVIL/MOTRIN) 200 MG tablet Take 600 mg by mouth every 6 hours as needed for pain       MAGNESIUM OXIDE PO Take 400 mg by mouth       Multiple Vitamin (MULTI-VITAMINS) TABS Take 1 tablet  "by mouth daily       propranolol (INDERAL) 20 MG tablet Take 1 tablet (20 mg) by mouth daily 90 tablet 3     rizatriptan (MAXALT-MLT) 10 MG ODT tab Take 1 tablet (10 mg) by mouth as needed 10 tablet 11     Allergies   Allergen Reactions     Topiramate      Other reaction(s): Dizziness       Review of Systems   Constitutional: Negative for activity change and fatigue.   HENT: Negative for congestion.    Respiratory: Negative for cough.    Neurological: Positive for paresthesias (pins/needles sensation on left forehead at site of impact.). Negative for dizziness and headaches.   Psychiatric/Behavioral: Negative for mood changes.        OBJECTIVE:     /80 (BP Location: Right arm, Patient Position: Sitting, Cuff Size: Adult Large)  Pulse 72  Ht 5' 9\" (1.753 m)  Wt 235 lb (106.6 kg)  BMI 34.7 kg/m2  Body mass index is 34.7 kg/(m^2).  Physical Exam   Constitutional: She is oriented to person, place, and time. She appears well-developed.   HENT:   Head: Normocephalic and atraumatic.   Eyes: Pupils are equal, round, and reactive to light.   Neck: Normal range of motion. Neck supple. No thyromegaly present.   Cardiovascular: Normal rate, regular rhythm and normal heart sounds.    No murmur heard.  Pulmonary/Chest: Effort normal and breath sounds normal. No respiratory distress. She has no wheezes. She has no rales.   Musculoskeletal: She exhibits no edema.   Lymphadenopathy:     She has no cervical adenopathy.   Neurological: She is alert and oriented to person, place, and time. No cranial nerve deficit.   Psychiatric: She has a normal mood and affect.         PHQ-2 Score:     PHQ-2 ( 1999 [employer]izer) 6/28/2018 6/5/2018   Q1: Little interest or pleasure in doing things 0 0   Q2: Feeling down, depressed or hopeless 0 0   PHQ-2 Score 0 0       I personally reviewed results withpatient as listed below:   Diagnostic Test Results:  none     ASSESSMENT/PLAN:       ICD-10-CM    1. Concussion without loss of consciousness, " subsequent encounter S06.0X0D        1.  She is much improved.  She is released to return to work full time without restrictions as of 6/21/18.  Follow up as needed.    Lillie Saldaña MD  [facility]

## 2018-06-28 NOTE — MR AVS SNAPSHOT
"              After Visit Summary   6/28/2018    Nicole Perera    MRN: [id number]           Patient Information     Date Of Birth          1960        Visit Information        Provider Department      6/28/2018 9:15 AM Lillie Saldaña MD [facility]         Follow-ups after your visit        Who to contact     If you have questions or need follow up information about today's clinic visit or your schedule please contact [facility] directly at [phone].  Normal or non-critical lab and imaging results will be communicated to you by Squawkahart, letter or phone within 4 business days after the clinic has received the results. If you do not hear from us within 7 days, please contact the clinic through [employer] or phone. If you have a critical or abnormal lab result, we will notify you by phone as soon as possible.  Submit refill requests through Alianza or call your pharmacy and they will forward the refill request to us. Please allow 3 business days for your refill to be completed.          Additional Information About Your Visit        MyChart Information     Alianza gives you secure access to your electronic health record. If you see a primary care provider, you can also send messages to your care team and make appointments. If you have questions, please call your primary care clinic.  If you do not have a primary care provider, please call [phone] and they will assist you.        Care EveryWhere ID     This is your Care EveryWhere ID. This could be used by other organizations to access your [facility] medical records  [id number]        Your Vitals Were     Pulse Height BMI (Body Mass Index)             72 5' 9\" (1.753 m) 34.7 kg/m2          Blood Pressure from Last 3 Encounters:   06/28/18 120/80   06/05/18 126/80   05/16/18 132/75    Weight from Last 3 Encounters:   06/28/18 235 lb (106.6 kg)   06/05/18 232 lb (105.2 kg)   05/16/18 226 lb (102.5 kg)    "           Today, you had the following     No orders found for display       Primary Care Provider Office Phone # Fax #    Lillie Concetta Saldaña [phone] [address]  [facility] [address]        Equal Access to Services     JOSEFAMAURICE ELHAM : Geoff agarwal nataliega Sogin, wanicholasda luqadaha, qaybta kaalmada adenadermarsha, dee humphrey elsiejayden mcgrathswathigeovani antunez . So [facility] [phone].    ATENCIÓN: Si habla español, tiene a redd disposición servicios gratuitos de asistencia lingüística. Llame al [phone].    We comply with applicable federal civil rights laws and [place] laws. We do not discriminate on the basis of race, color, national origin, age, disability, sex, sexual orientation, or gender identity.            Thank you!     Thank you for choosing [facility]  for your care. Our goal is always to provide you with excellent care. Hearing back from our patients is one way we can continue to improve our services. Please take a few minutes to complete the written survey that you may receive in the mail after your visit with us. Thank you!             Your Updated Medication List - Protect others around you: Learn how to safely use, store and throw away your medicines at www.disposemymeds.org.          This list is accurate as of 6/28/18  9:38 AM.  Always use your most recent med list.                   Brand Name Dispense Instructions for use Diagnosis    aspirin 81 MG EC tablet      Take 81 mg by mouth daily        Biotin 10 MG Caps           calcium-vitamin D 600-400 MG-UNIT per tablet    CALTRATE     Take 1 tablet by mouth 2 times daily (with meals)        conjugated estrogens cream    PREMARIN     daily        fish oil-omega-3 fatty acids 1000 MG capsule      Take 1 capsule by mouth daily        ibuprofen 200 MG tablet    ADVIL/MOTRIN     Take 600 mg by mouth every 6 hours as needed for pain        MAGNESIUM OXIDE PO      Take 400 mg by mouth         MULTI-VITAMINS Tabs      Take 1 tablet by mouth daily        propranolol 20 MG tablet    INDERAL    90 tablet    Take 1 tablet (20 mg) by mouth daily    Migraine without status migrainosus, not intractable, unspecified migraine type       rizatriptan 10 MG ODT tab    MAXALT-MLT    10 tablet    Take 1 tablet (10 mg) by mouth as needed    Migraine without status migrainosus, not intractable, unspecified migraine type

## 2018-06-28 NOTE — LETTER
[facility]  [address]  [place] [address]          June 28, 2018    RE:  Nicole Perera                                                                                                                                                       [address] [facility] [address]            To whom it may concern:    Nicole Perera is under my professional care for follow up after a work related injury which occurred on 5/15/18.  She is much better now. She  may return to work with the following: The employee is ABLE to return to work full time without restrictions as of 6/21/18.      Sincerely,        Lillie Saldaña MD

## 2019-04-15 ENCOUNTER — DOCUMENTATION ONLY (OUTPATIENT)
Dept: OTHER | Facility: CLINIC | Age: 59
End: 2019-04-15

## 2019-04-29 ENCOUNTER — TELEPHONE (OUTPATIENT)
Dept: FAMILY MEDICINE | Facility: OTHER | Age: 59
End: 2019-04-29

## 2019-04-29 ENCOUNTER — OFFICE VISIT (OUTPATIENT)
Dept: FAMILY MEDICINE | Facility: OTHER | Age: 59
End: 2019-04-29
Attending: FAMILY MEDICINE
Payer: COMMERCIAL

## 2019-04-29 VITALS
HEART RATE: 80 BPM | HEIGHT: 69 IN | WEIGHT: 235 LBS | DIASTOLIC BLOOD PRESSURE: 80 MMHG | SYSTOLIC BLOOD PRESSURE: 122 MMHG | RESPIRATION RATE: 22 BRPM | TEMPERATURE: 98.3 F | BODY MASS INDEX: 34.8 KG/M2

## 2019-04-29 DIAGNOSIS — S39.011A ABDOMINAL MUSCLE STRAIN, INITIAL ENCOUNTER: Primary | ICD-10-CM

## 2019-04-29 PROCEDURE — 99213 OFFICE O/P EST LOW 20 MIN: CPT | Performed by: FAMILY MEDICINE

## 2019-04-29 ASSESSMENT — ENCOUNTER SYMPTOMS
COUGH: 0
DIARRHEA: 0
ABDOMINAL PAIN: 1
CONSTIPATION: 0
NAUSEA: 0
FEVER: 0
VOMITING: 0
ABDOMINAL DISTENTION: 0
SHORTNESS OF BREATH: 0

## 2019-04-29 ASSESSMENT — PAIN SCALES - GENERAL: PAINLEVEL: EXTREME PAIN (9)

## 2019-04-29 ASSESSMENT — MIFFLIN-ST. JEOR: SCORE: 1705.33

## 2019-04-29 NOTE — TELEPHONE ENCOUNTER
CCA- Pt states she injured herself over the weekend thinks she may have a hernia. Would like to get worked into the schedule to be looked at.      Coral Restrepo on 4/29/2019 at 7:32 AM

## 2019-04-29 NOTE — PROGRESS NOTES
"  SUBJECTIVE:   Nursing Notes:   Gwendolyn Boogie LPN  4/29/2019 10:03 AM  Sign at exiting of workspace  Chief Complaint   Patient presents with     Abdominal Pain     started Saturday night      Patient reports having right side abdominal pain starting Saturday , was doing yard work .   Initial /80 (BP Location: Right arm, Patient Position: Sitting, Cuff Size: Adult Large)   Pulse 80   Temp 98.3  F (36.8  C) (Tympanic)   Resp 22   Ht 1.753 m (5' 9\")   Wt 106.6 kg (235 lb)   Breastfeeding? No   BMI 34.70 kg/m    Estimated body mass index is 34.7 kg/m  as calculated from the following:    Height as of this encounter: 1.753 m (5' 9\").    Weight as of this encounter: 106.6 kg (235 lb).  Medication Reconciliation: complete    Gwendolyn Boogie LPN    Nicole Perera is a 58 year old female who presents to clinic today for right sided abdominal pain.  Has been raking daily over the past 5 days.  2 days ago, she had been picking up landscape bricks and was hauling them across the yard.  That night, she got up to use the bathroom in the middle of the night and had a lot of right lower quadrant pain.  Once she was up and standing, felt ok.  Yesterday she felt fine all day.  Again this morning, hurt a lot when she was trying to get out of bed.  Once she was up, it felt ok.  No bulging/mass felt.  No fever.  Eating ok.  More gasey than normal.  Normal bowel movements.  No pain with bowel movement.  No diarrhea.  No blood in stool.      HPI    I personally reviewed medications/allergies/history listed below:    Patient Active Problem List    Diagnosis Date Noted     Fibrocystic breast disease 02/13/2018     Priority: Medium     Migraine headache 02/13/2018     Priority: Medium     Dyspareunia (CODE) 02/16/2015     Priority: Medium     Rosacea 02/16/2015     Priority: Medium     Past Medical History:   Diagnosis Date     Abnormal cytological finding in specimen from cervix uteri     Hx of abnormal Pap with " atypical squamous colp  showed benign reactive  epithelial changes, subsequent Paps have been normal.     Contact with and suspected exposure to communicable disease     Bat bite, left side of neck     Personal history of other medical treatment (CODE)          Personal history of other medical treatment (CODE)     HX of abnormal Pap with atypical squamous colp  showed benign reactive epithelial changes, subsequent Paps have been normal      Past Surgical History:   Procedure Laterality Date     APPENDECTOMY OPEN      No Comments Provided     BIOPSY BREAST      No Comments Provided      SECTION Right 1993    Rt breast biopsy for fibrocystic breast disease~     COLONOSCOPY  2010     normal  Next due .     ESOPHAGOSCOPY, GASTROSCOPY, DUODENOSCOPY (EGD), COMBINED  2007    Esophagogastroduodenoscopy     Family History   Problem Relation Age of Onset     Heart Disease Father 50        Heart Disease     Other - See Comments Father         Stroke     Other - See Comments Maternal Aunt         Migraines     Thyroid Disease Mother         Thyroid Disease,Thyroid disorder -after being stepped on by a cow     Diabetes Mother         Diabetes     Diabetes Brother         Diabetes     Other - See Comments Sister         CP     Substance Abuse Brother 44        Alcohol/Drug, ETOHism-     Social History     Tobacco Use     Smoking status: Never Smoker     Smokeless tobacco: Never Used   Substance Use Topics     Alcohol use: Yes     Alcohol/week: 0.6 oz     Comment: occasional 1/week     Social History     Social History Narrative    Does not smoke, guns at home are locked, minimal ETOH, does use sunscreen, uses seat belts 100% of the time.    .  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  Has a degree in health information management.    Arthur - son - in an RN working in public health.    Antonio -      Current Outpatient Medications   Medication Sig Dispense Refill      "aspirin EC 81 MG EC tablet Take 81 mg by mouth daily       Biotin 10 MG CAPS        calcium-vitamin D (CALTRATE) 600-400 MG-UNIT per tablet Take 1 tablet by mouth 2 times daily (with meals)       conjugated estrogens (PREMARIN) cream daily       fish oil-omega-3 fatty acids 1000 MG capsule Take 1 capsule by mouth daily       ibuprofen (ADVIL/MOTRIN) 200 MG tablet Take 600 mg by mouth every 6 hours as needed for pain       MAGNESIUM OXIDE PO Take 400 mg by mouth       Multiple Vitamin (MULTI-VITAMINS) TABS Take 1 tablet by mouth daily       propranolol (INDERAL) 20 MG tablet Take 1 tablet (20 mg) by mouth daily 90 tablet 3     rizatriptan (MAXALT-MLT) 10 MG ODT tab Take 1 tablet (10 mg) by mouth as needed 10 tablet 11     Allergies   Allergen Reactions     Topiramate      Other reaction(s): Dizziness       Review of Systems   Constitutional: Negative for fever.   Respiratory: Negative for cough and shortness of breath.    Gastrointestinal: Positive for abdominal pain. Negative for abdominal distention, constipation, diarrhea, nausea and vomiting.   Psychiatric/Behavioral: Negative for mood changes.        OBJECTIVE:     /80 (BP Location: Right arm, Patient Position: Sitting, Cuff Size: Adult Large)   Pulse 80   Temp 98.3  F (36.8  C) (Tympanic)   Resp 22   Ht 1.753 m (5' 9\")   Wt 106.6 kg (235 lb)   Breastfeeding? No   BMI 34.70 kg/m    Body mass index is 34.7 kg/m .  Physical Exam   Constitutional: She appears well-developed.   HENT:   Head: Normocephalic.   Eyes: Pupils are equal, round, and reactive to light.   Neck: Normal range of motion. Neck supple. No thyromegaly present.   Cardiovascular: Normal rate, regular rhythm and normal heart sounds.   No murmur heard.  Pulmonary/Chest: Effort normal and breath sounds normal. She has no wheezes. She has no rales.   Abdominal: Soft. Bowel sounds are normal. She exhibits no distension and no mass. There is tenderness. There is no rebound and no guarding. No " hernia.   Tender in one specific spot in her right lower quadrant.  Tenderness is not so much present with palpation, but is reproducible when she moves from a laying to seated position.  No hernia is palpable in this location.   Musculoskeletal: She exhibits no edema.   Psychiatric: She has a normal mood and affect.     PHQ-2 Score:     PHQ-2 ( 1999 Pf[employer]) 4/29/2019 6/28/2018   Q1: Little interest or pleasure in doing things 0 0   Q2: Feeling down, depressed or hopeless 0 0   PHQ-2 Score 0 0       I personally reviewed results withpatient as listed below:   Diagnostic Test Results:  none     ASSESSMENT/PLAN:       ICD-10-CM    1. Abdominal muscle strain, initial encounter S39.011A        1.  I suspect that she strained an abdominal muscle with lifting heavy landscape blocks over the weekend.  Symptoms are completely reproducible with specific movement.  Recommend avoiding heavy lifting.  Symptoms should improve with time, but might take several weeks for complete resolution.  Recommend over the counter medication for pain as needed.  Follow up if symptoms are getting significantly worse.  She does have an appointment at the end of this week for her yearly physical.  Will recheck at that time.      Lillie Saldaña MD  [facility]

## 2019-04-29 NOTE — TELEPHONE ENCOUNTER
We don't have any same days until Thursday . Should she be double booked or see another provider? Gwendolyn Bogoie LPN ....................4/29/2019  8:04 AM

## 2019-04-29 NOTE — NURSING NOTE
"Chief Complaint   Patient presents with     Abdominal Pain     started Saturday night      Patient reports having right side abdominal pain starting Saturday , was doing yard work .   Initial /80 (BP Location: Right arm, Patient Position: Sitting, Cuff Size: Adult Large)   Pulse 80   Temp 98.3  F (36.8  C) (Tympanic)   Resp 22   Ht 1.753 m (5' 9\")   Wt 106.6 kg (235 lb)   Breastfeeding? No   BMI 34.70 kg/m   Estimated body mass index is 34.7 kg/m  as calculated from the following:    Height as of this encounter: 1.753 m (5' 9\").    Weight as of this encounter: 106.6 kg (235 lb).  Medication Reconciliation: complete    Gwendolyn Boogie LPN  "

## 2019-05-03 ENCOUNTER — OFFICE VISIT (OUTPATIENT)
Dept: FAMILY MEDICINE | Facility: OTHER | Age: 59
End: 2019-05-03
Attending: FAMILY MEDICINE
Payer: COMMERCIAL

## 2019-05-03 VITALS
SYSTOLIC BLOOD PRESSURE: 140 MMHG | RESPIRATION RATE: 20 BRPM | HEART RATE: 74 BPM | WEIGHT: 235 LBS | HEIGHT: 69 IN | BODY MASS INDEX: 34.8 KG/M2 | DIASTOLIC BLOOD PRESSURE: 90 MMHG

## 2019-05-03 DIAGNOSIS — Z13.29 SCREENING FOR THYROID DISORDER: ICD-10-CM

## 2019-05-03 DIAGNOSIS — G43.909 MIGRAINE WITHOUT STATUS MIGRAINOSUS, NOT INTRACTABLE, UNSPECIFIED MIGRAINE TYPE: Primary | ICD-10-CM

## 2019-05-03 DIAGNOSIS — Z00.00 HEALTH CARE MAINTENANCE: ICD-10-CM

## 2019-05-03 DIAGNOSIS — Z78.0 POSTMENOPAUSE: ICD-10-CM

## 2019-05-03 DIAGNOSIS — Z13.0 SCREENING FOR DEFICIENCY ANEMIA: ICD-10-CM

## 2019-05-03 DIAGNOSIS — Z13.220 SCREENING FOR LIPID DISORDERS: ICD-10-CM

## 2019-05-03 DIAGNOSIS — Z13.1 SCREENING FOR DIABETES MELLITUS: ICD-10-CM

## 2019-05-03 PROCEDURE — 99396 PREV VISIT EST AGE 40-64: CPT | Performed by: FAMILY MEDICINE

## 2019-05-03 RX ORDER — LORATADINE 10 MG/1
10 TABLET ORAL DAILY
COMMUNITY
Start: 2019-05-03 | End: 2021-08-26

## 2019-05-03 RX ORDER — PROPRANOLOL HYDROCHLORIDE 20 MG/1
20 TABLET ORAL DAILY
Qty: 90 TABLET | Refills: 3 | Status: SHIPPED | OUTPATIENT
Start: 2019-05-03 | End: 2020-06-05

## 2019-05-03 RX ORDER — RIZATRIPTAN BENZOATE 10 MG/1
10 TABLET, ORALLY DISINTEGRATING ORAL PRN
Qty: 10 TABLET | Refills: 11 | Status: SHIPPED | OUTPATIENT
Start: 2019-05-03 | End: 2020-05-05

## 2019-05-03 ASSESSMENT — ENCOUNTER SYMPTOMS
DYSPHORIC MOOD: 0
FEVER: 0
HEADACHES: 1
COUGH: 0

## 2019-05-03 ASSESSMENT — PAIN SCALES - GENERAL: PAINLEVEL: NO PAIN (0)

## 2019-05-03 ASSESSMENT — MIFFLIN-ST. JEOR: SCORE: 1705.33

## 2019-05-03 NOTE — NURSING NOTE
"Chief Complaint   Patient presents with     Recheck Medication       Initial /90 (BP Location: Right arm, Patient Position: Sitting, Cuff Size: Adult Large)   Pulse 74   Resp 20   Ht 1.753 m (5' 9\")   Wt 106.6 kg (235 lb)   Breastfeeding? No   BMI 34.70 kg/m   Estimated body mass index is 34.7 kg/m  as calculated from the following:    Height as of this encounter: 1.753 m (5' 9\").    Weight as of this encounter: 106.6 kg (235 lb).  Medication Reconciliation: complete    Gwendolyn Boogie LPN  "

## 2019-05-13 DIAGNOSIS — Z13.1 SCREENING FOR DIABETES MELLITUS: ICD-10-CM

## 2019-05-13 DIAGNOSIS — Z13.0 SCREENING FOR DEFICIENCY ANEMIA: ICD-10-CM

## 2019-05-13 DIAGNOSIS — Z13.29 SCREENING FOR THYROID DISORDER: ICD-10-CM

## 2019-05-13 DIAGNOSIS — Z13.220 SCREENING FOR LIPID DISORDERS: ICD-10-CM

## 2019-05-13 DIAGNOSIS — K21.00 GASTROESOPHAGEAL REFLUX DISEASE WITH ESOPHAGITIS: Primary | ICD-10-CM

## 2019-05-13 LAB
ALBUMIN SERPL-MCNC: 4.4 G/DL (ref 3.5–5.7)
ALP SERPL-CCNC: 61 U/L (ref 34–104)
ALT SERPL W P-5'-P-CCNC: 24 U/L (ref 7–52)
ANION GAP SERPL CALCULATED.3IONS-SCNC: 7 MMOL/L (ref 3–14)
AST SERPL W P-5'-P-CCNC: 23 U/L (ref 13–39)
BASOPHILS # BLD AUTO: 0 10E9/L (ref 0–0.2)
BASOPHILS NFR BLD AUTO: 0.6 %
BILIRUB SERPL-MCNC: 0.5 MG/DL (ref 0.3–1)
BUN SERPL-MCNC: 20 MG/DL (ref 7–25)
CALCIUM SERPL-MCNC: 10.2 MG/DL (ref 8.6–10.3)
CHLORIDE SERPL-SCNC: 105 MMOL/L (ref 98–107)
CHOLEST SERPL-MCNC: 233 MG/DL
CO2 SERPL-SCNC: 27 MMOL/L (ref 21–31)
CREAT SERPL-MCNC: 1 MG/DL (ref 0.6–1.2)
DIFFERENTIAL METHOD BLD: NORMAL
EOSINOPHIL # BLD AUTO: 0.1 10E9/L (ref 0–0.7)
EOSINOPHIL NFR BLD AUTO: 1.5 %
ERYTHROCYTE [DISTWIDTH] IN BLOOD BY AUTOMATED COUNT: 12.3 % (ref 10–15)
GFR SERPL CREATININE-BSD FRML MDRD: 57 ML/MIN/{1.73_M2}
GLUCOSE SERPL-MCNC: 115 MG/DL (ref 70–105)
HCT VFR BLD AUTO: 43.1 % (ref 35–47)
HDLC SERPL-MCNC: 49 MG/DL (ref 23–92)
HGB BLD-MCNC: 14.5 G/DL (ref 11.7–15.7)
IMM GRANULOCYTES # BLD: 0 10E9/L (ref 0–0.4)
IMM GRANULOCYTES NFR BLD: 0.2 %
LDLC SERPL CALC-MCNC: 166 MG/DL
LYMPHOCYTES # BLD AUTO: 2.1 10E9/L (ref 0.8–5.3)
LYMPHOCYTES NFR BLD AUTO: 40.8 %
MCH RBC QN AUTO: 32.6 PG (ref 26.5–33)
MCHC RBC AUTO-ENTMCNC: 33.6 G/DL (ref 31.5–36.5)
MCV RBC AUTO: 97 FL (ref 78–100)
MONOCYTES # BLD AUTO: 0.5 10E9/L (ref 0–1.3)
MONOCYTES NFR BLD AUTO: 8.7 %
NEUTROPHILS # BLD AUTO: 2.5 10E9/L (ref 1.6–8.3)
NEUTROPHILS NFR BLD AUTO: 48.2 %
NONHDLC SERPL-MCNC: 184 MG/DL
PLATELET # BLD AUTO: 255 10E9/L (ref 150–450)
POTASSIUM SERPL-SCNC: 4 MMOL/L (ref 3.5–5.1)
PROT SERPL-MCNC: 7.1 G/DL (ref 6.4–8.9)
RBC # BLD AUTO: 4.45 10E12/L (ref 3.8–5.2)
SODIUM SERPL-SCNC: 139 MMOL/L (ref 134–144)
TRIGL SERPL-MCNC: 90 MG/DL
TSH SERPL DL<=0.05 MIU/L-ACNC: 1.48 IU/ML (ref 0.34–5.6)
WBC # BLD AUTO: 5.2 10E9/L (ref 4–11)

## 2019-05-13 PROCEDURE — 36415 COLL VENOUS BLD VENIPUNCTURE: CPT | Performed by: FAMILY MEDICINE

## 2019-05-13 PROCEDURE — 80061 LIPID PANEL: CPT | Performed by: FAMILY MEDICINE

## 2019-05-13 PROCEDURE — 85025 COMPLETE CBC W/AUTO DIFF WBC: CPT | Performed by: FAMILY MEDICINE

## 2019-05-13 PROCEDURE — 84443 ASSAY THYROID STIM HORMONE: CPT | Performed by: FAMILY MEDICINE

## 2019-05-13 PROCEDURE — 80053 COMPREHEN METABOLIC PANEL: CPT | Performed by: FAMILY MEDICINE

## 2019-05-13 NOTE — TELEPHONE ENCOUNTER
Nicole self reports that dentist feels she has reflux based on what he saw in her throat. She was asking about omeprazole but willing to try ranitidine first. Rx written per Collaborative Practice Agreement for OTC meds marcela;quinton brown Rx also

## 2019-05-17 ENCOUNTER — HOSPITAL ENCOUNTER (OUTPATIENT)
Dept: MAMMOGRAPHY | Facility: OTHER | Age: 59
End: 2019-05-17
Attending: FAMILY MEDICINE
Payer: COMMERCIAL

## 2019-05-17 DIAGNOSIS — Z12.31 VISIT FOR SCREENING MAMMOGRAM: ICD-10-CM

## 2019-05-17 PROCEDURE — 77063 BREAST TOMOSYNTHESIS BI: CPT

## 2019-05-20 ENCOUNTER — HOSPITAL ENCOUNTER (OUTPATIENT)
Dept: BONE DENSITY | Facility: OTHER | Age: 59
Discharge: HOME | End: 2019-05-20
Attending: FAMILY MEDICINE | Admitting: FAMILY MEDICINE
Payer: COMMERCIAL

## 2019-05-20 DIAGNOSIS — Z78.0 POSTMENOPAUSE: ICD-10-CM

## 2019-05-20 PROCEDURE — 77080 DXA BONE DENSITY AXIAL: CPT

## 2020-03-11 ENCOUNTER — HEALTH MAINTENANCE LETTER (OUTPATIENT)
Age: 60
End: 2020-03-11

## 2020-05-04 DIAGNOSIS — G43.909 MIGRAINE WITHOUT STATUS MIGRAINOSUS, NOT INTRACTABLE, UNSPECIFIED MIGRAINE TYPE: ICD-10-CM

## 2020-05-05 RX ORDER — RIZATRIPTAN BENZOATE 10 MG/1
10 TABLET, ORALLY DISINTEGRATING ORAL PRN
Qty: 10 TABLET | Refills: 11 | Status: SHIPPED | OUTPATIENT
Start: 2020-05-05 | End: 2020-06-30

## 2020-05-05 NOTE — TELEPHONE ENCOUNTER
Rizatriptan (MAXALT-MLT) 10 MG ODT   Last Written Prescription Date: 05/03/2019  Last Fill Quantity: 10 tabs,   # refills: 11  Last Office Visit: 05/03/2019  Future Office visit:       Routing refill request to provider for review/approval because:  Due for a annual visit     Unable to complete prescription refill per RNMedication Refill Policy.................... Marbella Chang RN ....................  5/5/2020   3:53 PM

## 2020-06-05 DIAGNOSIS — G43.909 MIGRAINE WITHOUT STATUS MIGRAINOSUS, NOT INTRACTABLE, UNSPECIFIED MIGRAINE TYPE: ICD-10-CM

## 2020-06-05 RX ORDER — PROPRANOLOL HYDROCHLORIDE 20 MG/1
20 TABLET ORAL DAILY
Qty: 90 TABLET | Refills: 1 | Status: SHIPPED | OUTPATIENT
Start: 2020-06-05 | End: 2020-06-30

## 2020-06-05 NOTE — LETTER
June 5, 2020      Nicole Perera  [address] [facility] [address]      Dear Nicole Kerr,     This is to remind you that you are due for your annual visit. Your last visit was on 05/03/2019.     Additional refills of your medication require you to complete this visit.    Please call [phone] to schedule your appointment.    Thank you for choosing [facility] for your health care needs.    Sincerely,      Refill RN  [facility]

## 2020-06-05 NOTE — TELEPHONE ENCOUNTER
Propranolol (INDERAL) 20 MG tablet   Last Written Prescription Date: 05/03/2019  Last Fill Quantity: 90,   # refills: 3  Last Office Visit: 05/03/2019. Due for annual visit. Letter sent.   Future Office visit:       Routing refill request to provider for review/approval.     Unable to complete prescription refill per RNMedication Refill Policy.................... Marbella Chang RN ....................  6/5/2020   9:52 AM

## 2020-06-30 ENCOUNTER — OFFICE VISIT (OUTPATIENT)
Dept: FAMILY MEDICINE | Facility: OTHER | Age: 60
End: 2020-06-30
Attending: FAMILY MEDICINE
Payer: COMMERCIAL

## 2020-06-30 VITALS
RESPIRATION RATE: 18 BRPM | HEIGHT: 69 IN | OXYGEN SATURATION: 99 % | DIASTOLIC BLOOD PRESSURE: 60 MMHG | SYSTOLIC BLOOD PRESSURE: 126 MMHG | BODY MASS INDEX: 34.72 KG/M2 | HEART RATE: 68 BPM | WEIGHT: 234.44 LBS | TEMPERATURE: 97.3 F

## 2020-06-30 DIAGNOSIS — S80.11XA CONTUSION OF RIGHT LOWER LEG, INITIAL ENCOUNTER: ICD-10-CM

## 2020-06-30 DIAGNOSIS — G43.909 MIGRAINE WITHOUT STATUS MIGRAINOSUS, NOT INTRACTABLE, UNSPECIFIED MIGRAINE TYPE: ICD-10-CM

## 2020-06-30 DIAGNOSIS — Z13.1 SCREENING FOR DIABETES MELLITUS: ICD-10-CM

## 2020-06-30 DIAGNOSIS — Z00.00 HEALTH CARE MAINTENANCE: Primary | ICD-10-CM

## 2020-06-30 DIAGNOSIS — Z12.11 SCREEN FOR COLON CANCER: ICD-10-CM

## 2020-06-30 DIAGNOSIS — B35.1 ONYCHOMYCOSIS: ICD-10-CM

## 2020-06-30 DIAGNOSIS — Z13.220 SCREENING FOR LIPID DISORDERS: ICD-10-CM

## 2020-06-30 PROCEDURE — 99396 PREV VISIT EST AGE 40-64: CPT | Performed by: FAMILY MEDICINE

## 2020-06-30 RX ORDER — RIZATRIPTAN BENZOATE 10 MG/1
10 TABLET, ORALLY DISINTEGRATING ORAL PRN
Qty: 10 TABLET | Refills: 11 | Status: SHIPPED | OUTPATIENT
Start: 2020-06-30 | End: 2021-07-16

## 2020-06-30 RX ORDER — FAMOTIDINE 20 MG/1
20 TABLET, FILM COATED ORAL DAILY PRN
COMMUNITY
Start: 2020-06-30 | End: 2020-07-13

## 2020-06-30 RX ORDER — TERBINAFINE HYDROCHLORIDE 250 MG/1
250 TABLET ORAL DAILY
Qty: 90 TABLET | Refills: 0 | Status: SHIPPED | OUTPATIENT
Start: 2020-06-30 | End: 2020-09-28

## 2020-06-30 RX ORDER — PROPRANOLOL HYDROCHLORIDE 20 MG/1
20 TABLET ORAL DAILY
Qty: 90 TABLET | Refills: 3 | Status: SHIPPED | OUTPATIENT
Start: 2020-06-30 | End: 2021-07-16

## 2020-06-30 ASSESSMENT — PAIN SCALES - GENERAL: PAINLEVEL: MILD PAIN (3)

## 2020-06-30 ASSESSMENT — MIFFLIN-ST. JEOR: SCORE: 1689.84

## 2020-06-30 NOTE — PATIENT INSTRUCTIONS
Check to see if the shingrix is covered by your insurance.  If it is and you would like this ordered for you, please let me know.  If it is covered, find out if you can have it at the clinic or if it needs to be given at a pharmacy.

## 2020-06-30 NOTE — NURSING NOTE
Patient presents to clinic for physical and annual exam with refills.  Medication Reconciliation: complete    Claudette Mcneil LPN

## 2020-06-30 NOTE — PROGRESS NOTES
SUBJECTIVE:   Nursing Notes:   Claudette Mcneil LPN  2020  2:48 PM  Signed  Patient presents to clinic for physical and annual exam with refills.  Medication Reconciliation: complete    Claudette Mcneil LPN      Nicole Perera is a 59 year old female who presents to clinic today for a physical.     She had been walking & riding bike for about 6 hours last weekend and had at one point fallen when getting on or off of her bike and got some right lower leg bruises.  Was sore walking for a day or so, but better now  .  Right foot has some thickening of the nails.    HPI    I personally reviewed medications/allergies/history listed below:    Patient Active Problem List    Diagnosis Date Noted     Fibrocystic breast disease 2018     Priority: Medium     Migraine headache 2018     Priority: Medium     Dyspareunia (CODE) 2015     Priority: Medium     Rosacea 2015     Priority: Medium     Past Medical History:   Diagnosis Date     Abnormal cytological finding in specimen from cervix uteri     Hx of abnormal Pap with atypical squamous colp  showed benign reactive  epithelial changes, subsequent Paps have been normal.     Contact with and suspected exposure to communicable disease     Bat bite, left side of neck     Personal history of other medical treatment (CODE)          Personal history of other medical treatment (CODE)     HX of abnormal Pap with atypical squamous colp  showed benign reactive epithelial changes, subsequent Paps have been normal      Past Surgical History:   Procedure Laterality Date     APPENDECTOMY OPEN      No Comments Provided     BIOPSY BREAST      No Comments Provided      SECTION Right 1993    Rt breast biopsy for fibrocystic breast disease~     COLONOSCOPY  2010     normal  Next due .     ESOPHAGOSCOPY, GASTROSCOPY, DUODENOSCOPY (EGD), COMBINED  2007    Esophagogastroduodenoscopy     Family History   Problem Relation Age of  Onset     Heart Disease Father 50        Heart Disease     Other - See Comments Father         Stroke     Other - See Comments Maternal Aunt         Migraines     Thyroid Disease Mother         Thyroid Disease,Thyroid disorder -after being stepped on by a cow     Diabetes Mother         Diabetes     Diabetes Brother         Diabetes     Other - See Comments Sister         CP     Substance Abuse Brother 44        Alcohol/Drug, ETOHism-     Social History     Tobacco Use     Smoking status: Never Smoker     Smokeless tobacco: Never Used   Substance Use Topics     Alcohol use: Yes     Alcohol/week: 1.0 standard drinks     Comment: occasional 1/week     Social History     Social History Narrative    Does not smoke, guns at home are locked, minimal ETOH, does use sunscreen, uses seat belts 100% of the time.    .  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  Has a degree in health information management.    Arthur - son - in an RN working in public health.    Antonio -      Current Outpatient Medications   Medication Sig Dispense Refill     aspirin EC 81 MG EC tablet Take 81 mg by mouth daily       calcium-vitamin D (CALTRATE) 600-400 MG-UNIT per tablet Take 1 tablet by mouth 2 times daily (with meals)       famotidine (PEPCID) 20 MG tablet Take 1 tablet (20 mg) by mouth daily as needed       fish oil-omega-3 fatty acids 1000 MG capsule Take 1 capsule by mouth daily       ibuprofen (ADVIL/MOTRIN) 200 MG tablet Take 600 mg by mouth every 6 hours as needed for pain       loratadine (CLARITIN) 10 MG tablet Take 1 tablet (10 mg) by mouth daily       MAGNESIUM OXIDE PO Take 400 mg by mouth       Multiple Vitamin (MULTI-VITAMINS) TABS Take 1 tablet by mouth daily       propranolol (INDERAL) 20 MG tablet Take 1 tablet (20 mg) by mouth daily 90 tablet 3     rizatriptan (MAXALT-MLT) 10 MG ODT Take 1 tablet (10 mg) by mouth as needed 10 tablet 11     terbinafine (LAMISIL) 250 MG tablet Take 1 tablet (250 mg) by  "mouth daily 90 tablet 0     Allergies   Allergen Reactions     Topiramate      Other reaction(s): Dizziness       Review of Systems   Constitutional: Negative for chills and fever.   Respiratory: Negative for cough and shortness of breath.    Cardiovascular: Negative for peripheral edema.   Psychiatric/Behavioral: Negative for mood changes. The patient is not nervous/anxious.         OBJECTIVE:     /60 (BP Location: Right arm, Patient Position: Sitting, Cuff Size: Adult Large)   Pulse 68   Temp 97.3  F (36.3  C) (Tympanic)   Resp 18   Ht 1.74 m (5' 8.5\")   Wt 106.3 kg (234 lb 7 oz)   LMP  (LMP Unknown)   SpO2 99%   Breastfeeding No   BMI 35.13 kg/m    Body mass index is 35.13 kg/m .  Physical Exam  Constitutional:       General: She is not in acute distress.     Appearance: She is well-developed.   HENT:      Head: Normocephalic.      Right Ear: Tympanic membrane and external ear normal.      Left Ear: Tympanic membrane and external ear normal.      Nose: Nose normal.      Mouth/Throat:      Pharynx: No oropharyngeal exudate.   Eyes:      General:         Right eye: No discharge.         Left eye: No discharge.      Conjunctiva/sclera: Conjunctivae normal.      Pupils: Pupils are equal, round, and reactive to light.   Neck:      Musculoskeletal: Neck supple.      Thyroid: No thyromegaly.      Trachea: No tracheal deviation.   Cardiovascular:      Rate and Rhythm: Normal rate and regular rhythm.      Pulses: Normal pulses.      Heart sounds: Normal heart sounds, S1 normal and S2 normal. No murmur. No friction rub. No gallop. No S3 or S4 sounds.    Pulmonary:      Effort: Pulmonary effort is normal. No respiratory distress.      Breath sounds: Normal breath sounds. No wheezing or rales.      Comments: Breast exam:  No masses palpable bilaterally.  No skin changes, tethering or axillary lymphadenopathy bilaterally.    Abdominal:      General: Bowel sounds are normal. There is no distension.      " Palpations: Abdomen is soft. There is no mass.      Tenderness: There is no abdominal tenderness.   Genitourinary:     Comments: Pelvic/Rectal exams deferred per patient.  Musculoskeletal: Normal range of motion.      Comments: Several bruises of her right lower leg.   Lymphadenopathy:      Cervical: No cervical adenopathy.   Skin:     General: Skin is warm and dry.      Findings: No rash.      Comments: Thickening of some of the nails on her right foot.   Neurological:      Mental Status: She is alert and oriented to person, place, and time.      Motor: No abnormal muscle tone.      Deep Tendon Reflexes: Reflexes are normal and symmetric.   Psychiatric:         Thought Content: Thought content normal.         Judgment: Judgment normal.           No flowsheet data found.    PHQ-2 Score:     PHQ-2 ( 1999 Pf[employer]) 6/30/2020 5/3/2019   Q1: Little interest or pleasure in doing things 0 0   Q2: Feeling down, depressed or hopeless 0 0   PHQ-2 Score 0 0       No flowsheet data found.      No flowsheet data found.      I personally reviewed results withpatient as listed below:   Diagnostic Test Results:  none     ASSESSMENT/PLAN:       ICD-10-CM    1. Health care maintenance  Z00.00    2. Screen for colon cancer  Z12.11 ABSTRACT COLOGUARD-NO CHARGE   3. Screening for lipid disorders  Z13.220 Lipid Profile   4. Screening for diabetes mellitus  Z13.1 Comprehensive Metabolic Panel   5. Migraine without status migrainosus, not intractable, unspecified migraine type  G43.909 propranolol (INDERAL) 20 MG tablet     rizatriptan (MAXALT-MLT) 10 MG ODT   6. Onychomycosis  B35.1 terbinafine (LAMISIL) 250 MG tablet     Hepatic Function Panel   7. Contusion of right lower leg, initial encounter  S80.11XA        1.  Mammogram discussed.  She would like to do these every 2 years.  Her last was done on 5/17/2019.  Last DEXA scan completed on 5/20/2019 and was normal.  Colonoscopy is due.  Cologuard ordered today.  Pap is up-to-date, last  completed 4/3/2018 Pap and HPV were both normal at that time.  Tdap is up-to-date, last completed 4/3/2018.  2.  Cologuard ordered as noted above.  3.  Fasting lipid profile ordered as above.  4.  Glucose as part of comprehensive metabolic profile was ordered.  5.  Stable.  Meds were refilled as noted above.  6.  Discussed options.  She would like to try terbinafine by mouth.  This is ordered.  LFTs were completed today as part of comprehensive metabolic profile.  Discussed that she should have labs to recheck LFTs monthly while on this medication.  7.  Discussed that bruises should dissipate over the next several weeks.  The fact that she can bear weight on her legs makes fracture much less likely.  Her symptoms have actually improved a lot in the last couple of days.    Lillie Saldaña MD  [facility]    Portions of this dictation were created using the Dragon Nuance voice recognition system. Proofreading was completed but there may be errors in text.

## 2020-07-01 DIAGNOSIS — Z13.220 SCREENING FOR LIPID DISORDERS: ICD-10-CM

## 2020-07-01 DIAGNOSIS — Z13.1 SCREENING FOR DIABETES MELLITUS: ICD-10-CM

## 2020-07-01 LAB
ALBUMIN SERPL-MCNC: 4.2 G/DL (ref 3.5–5.7)
ALP SERPL-CCNC: 56 U/L (ref 34–104)
ALT SERPL W P-5'-P-CCNC: 14 U/L (ref 7–52)
ANION GAP SERPL CALCULATED.3IONS-SCNC: 9 MMOL/L (ref 3–14)
AST SERPL W P-5'-P-CCNC: 17 U/L (ref 13–39)
BILIRUB DIRECT SERPL-MCNC: 0.1 MG/DL (ref 0–0.2)
BILIRUB SERPL-MCNC: 0.6 MG/DL (ref 0.3–1)
BUN SERPL-MCNC: 20 MG/DL (ref 7–25)
CALCIUM SERPL-MCNC: 9.7 MG/DL (ref 8.6–10.3)
CHLORIDE SERPL-SCNC: 104 MMOL/L (ref 98–107)
CHOLEST SERPL-MCNC: 216 MG/DL
CO2 SERPL-SCNC: 26 MMOL/L (ref 21–31)
CREAT SERPL-MCNC: 1.05 MG/DL (ref 0.6–1.2)
GFR SERPL CREATININE-BSD FRML MDRD: 53 ML/MIN/{1.73_M2}
GLUCOSE SERPL-MCNC: 108 MG/DL (ref 70–105)
HDLC SERPL-MCNC: 42 MG/DL (ref 23–92)
LDLC SERPL CALC-MCNC: 143 MG/DL
NONHDLC SERPL-MCNC: 174 MG/DL
POTASSIUM SERPL-SCNC: 4 MMOL/L (ref 3.5–5.1)
PROT SERPL-MCNC: 7 G/DL (ref 6.4–8.9)
SODIUM SERPL-SCNC: 139 MMOL/L (ref 134–144)
TRIGL SERPL-MCNC: 156 MG/DL

## 2020-07-01 PROCEDURE — 82248 BILIRUBIN DIRECT: CPT | Mod: ZL | Performed by: FAMILY MEDICINE

## 2020-07-01 PROCEDURE — 80053 COMPREHEN METABOLIC PANEL: CPT | Mod: ZL | Performed by: FAMILY MEDICINE

## 2020-07-01 PROCEDURE — 36415 COLL VENOUS BLD VENIPUNCTURE: CPT | Mod: ZL | Performed by: FAMILY MEDICINE

## 2020-07-01 PROCEDURE — 80061 LIPID PANEL: CPT | Mod: ZL | Performed by: FAMILY MEDICINE

## 2020-07-01 ASSESSMENT — ENCOUNTER SYMPTOMS
COUGH: 0
NERVOUS/ANXIOUS: 0
FEVER: 0
CHILLS: 0
SHORTNESS OF BREATH: 0

## 2020-07-07 ENCOUNTER — TELEPHONE (OUTPATIENT)
Dept: FAMILY MEDICINE | Facility: OTHER | Age: 60
End: 2020-07-07

## 2020-07-07 LAB — COLOGUARD-ABSTRACT: NEGATIVE

## 2020-07-07 NOTE — TELEPHONE ENCOUNTER
Patient would like to be put on waiting list for the shingrix vaccine.     Olinda Gomez on 7/7/2020 at 1:36 PM

## 2020-07-13 ENCOUNTER — OFFICE VISIT (OUTPATIENT)
Dept: FAMILY MEDICINE | Facility: OTHER | Age: 60
End: 2020-07-13
Attending: PHYSICIAN ASSISTANT
Payer: COMMERCIAL

## 2020-07-13 VITALS
HEART RATE: 62 BPM | SYSTOLIC BLOOD PRESSURE: 124 MMHG | RESPIRATION RATE: 16 BRPM | OXYGEN SATURATION: 98 % | TEMPERATURE: 97 F | WEIGHT: 234.6 LBS | BODY MASS INDEX: 35.15 KG/M2 | DIASTOLIC BLOOD PRESSURE: 70 MMHG

## 2020-07-13 DIAGNOSIS — R35.0 URINARY FREQUENCY: ICD-10-CM

## 2020-07-13 DIAGNOSIS — N30.00 ACUTE CYSTITIS WITHOUT HEMATURIA: Primary | ICD-10-CM

## 2020-07-13 LAB
ALBUMIN UR-MCNC: 10 MG/DL
APPEARANCE UR: CLEAR
BACTERIA #/AREA URNS HPF: ABNORMAL /HPF
BILIRUB UR QL STRIP: NEGATIVE
COLOR UR AUTO: ABNORMAL
GLUCOSE UR STRIP-MCNC: NEGATIVE MG/DL
HGB UR QL STRIP: ABNORMAL
KETONES UR STRIP-MCNC: NEGATIVE MG/DL
LEUKOCYTE ESTERASE UR QL STRIP: ABNORMAL
MUCOUS THREADS #/AREA URNS LPF: PRESENT /LPF
NITRATE UR QL: NEGATIVE
PH UR STRIP: 6.5 PH (ref 5–7)
RBC #/AREA URNS AUTO: 25 /HPF (ref 0–2)
SOURCE: ABNORMAL
SP GR UR STRIP: 1.01 (ref 1–1.03)
UROBILINOGEN UR STRIP-MCNC: NORMAL MG/DL (ref 0–2)
WBC #/AREA URNS AUTO: 62 /HPF (ref 0–5)
YEAST #/AREA URNS HPF: ABNORMAL /HPF

## 2020-07-13 PROCEDURE — 87086 URINE CULTURE/COLONY COUNT: CPT | Mod: ZL | Performed by: PHYSICIAN ASSISTANT

## 2020-07-13 PROCEDURE — 99213 OFFICE O/P EST LOW 20 MIN: CPT | Performed by: PHYSICIAN ASSISTANT

## 2020-07-13 PROCEDURE — 87088 URINE BACTERIA CULTURE: CPT | Mod: ZL | Performed by: PHYSICIAN ASSISTANT

## 2020-07-13 PROCEDURE — 81001 URINALYSIS AUTO W/SCOPE: CPT | Mod: ZL | Performed by: PHYSICIAN ASSISTANT

## 2020-07-13 RX ORDER — SULFAMETHOXAZOLE AND TRIMETHOPRIM 400; 80 MG/1; MG/1
1 TABLET ORAL 2 TIMES DAILY
Qty: 6 TABLET | Refills: 0 | Status: SHIPPED | OUTPATIENT
Start: 2020-07-13 | End: 2020-07-16

## 2020-07-13 RX ORDER — FLUCONAZOLE 150 MG/1
150 TABLET ORAL ONCE
Qty: 1 TABLET | Refills: 0 | Status: SHIPPED | OUTPATIENT
Start: 2020-07-13 | End: 2020-07-13

## 2020-07-13 ASSESSMENT — PAIN SCALES - GENERAL: PAINLEVEL: NO PAIN (1)

## 2020-07-13 NOTE — PROGRESS NOTES
SUBJECTIVE:   Nicole Perera is a 59 year old female here for UTI-like symptoms.    HPI  Patient reports she noted symptoms of urethral discomfort and burning last night.  This is the second time she has had a UTI in the past month.  Previous to that was possibly a year ago.  She has a history of uterine prolapse which was surgically repaired.  She denies any prolapse features today.  She describes pain around the urethra as well as dysuria with urination.  She denies any vaginal discharge however has noted a slight odor to the urine.  She denies abdominal pain or discomfort or any pain of the back.  She denies fevers, chills, night sweats, constipation or diarrhea.  Patient states she applied some alcohol to a cotton swab and cleansed her vaginal and urethral area.  She states she employed this technique the last time she had a UTI and her symptoms resolved quickly.    Allergies:  Allergies   Allergen Reactions     Topiramate      Other reaction(s): Dizziness       Review of Systems   As above otherwise ROS is unremarkable.     OBJECTIVE:   /70 (BP Location: Right arm, Patient Position: Sitting, Cuff Size: Adult Regular)   Pulse 62   Temp 97  F (36.1  C) (Temporal)   Resp 16   Wt 106.4 kg (234 lb 9.6 oz)   LMP  (LMP Unknown)   SpO2 98%   Breastfeeding No   BMI 35.15 kg/m      Physical Exam  General Appearance: Pleasant, alert, appropriate appearance for age and circumstances, no acute distress  Head: Normocephalic, atraumatic  : Deferred by patient  Skin: no concerning or new rashes  Psychiatric Exam: Alert and oriented, appropriate affect    Results for orders placed or performed in visit on 07/13/20   UA reflex to Microscopic and Culture     Status: Abnormal    Specimen: Midstream Urine   Result Value Ref Range    Color Urine Light Yellow     Appearance Urine Clear     Glucose Urine Negative NEG^Negative mg/dL    Bilirubin Urine Negative NEG^Negative    Ketones Urine Negative NEG^Negative mg/dL     Specific Gravity Urine 1.012 1.003 - 1.035    Blood Urine Small (A) NEG^Negative    pH Urine 6.5 5.0 - 7.0 pH    Protein Albumin Urine 10 (A) NEG^Negative mg/dL    Urobilinogen mg/dL Normal 0.0 - 2.0 mg/dL    Nitrite Urine Negative NEG^Negative    Leukocyte Esterase Urine Large (A) NEG^Negative    Source Midstream Urine     RBC Urine 25 (H) 0 - 2 /HPF    WBC Urine 62 (H) 0 - 5 /HPF    Bacteria Urine Few (A) NEG^Negative /HPF    Yeast Urine Few (A) NEG^Negative /HPF    Mucous Urine Present (A) NEG^Negative /LPF       ASSESSMENT/PLAN:     1. Acute cystitis without hematuria    2. Urinary frequency      Orders Placed This Encounter   Procedures     UA reflex to Microscopic and Culture       UA positive which shows bacteria as well as yeast     Bactrim for uncomplicated UTI    Fluconazole for vaginal candidiasis prophylaxis    Will await culture results and inform antibiotic choice accordingly    Follow-up if symptoms persist or worsen    NUNU Spaulding  [facility] [place]    This document was prepared using voice generated software.  While every attempt was made for accuracy, grammatical errors may exist.

## 2020-07-13 NOTE — NURSING NOTE
Chief Complaint   Patient presents with     UTI     Burning and frequency started last night.     Medication Reconciliation: complete    Arleen Brown LPN

## 2020-07-15 LAB
BACTERIA SPEC CULT: ABNORMAL
SPECIMEN SOURCE: ABNORMAL

## 2020-08-04 DIAGNOSIS — B35.1 ONYCHOMYCOSIS: ICD-10-CM

## 2020-08-04 LAB
ALBUMIN SERPL-MCNC: 4.4 G/DL (ref 3.5–5.7)
ALP SERPL-CCNC: 54 U/L (ref 34–104)
ALT SERPL W P-5'-P-CCNC: 12 U/L (ref 7–52)
AST SERPL W P-5'-P-CCNC: 16 U/L (ref 13–39)
BILIRUB DIRECT SERPL-MCNC: 0.1 MG/DL (ref 0–0.2)
BILIRUB SERPL-MCNC: 0.5 MG/DL (ref 0.3–1)
PROT SERPL-MCNC: 7.6 G/DL (ref 6.4–8.9)

## 2020-08-04 PROCEDURE — 36415 COLL VENOUS BLD VENIPUNCTURE: CPT | Mod: ZL | Performed by: FAMILY MEDICINE

## 2020-08-04 PROCEDURE — 80076 HEPATIC FUNCTION PANEL: CPT | Mod: ZL | Performed by: FAMILY MEDICINE

## 2020-09-01 DIAGNOSIS — B35.1 ONYCHOMYCOSIS: ICD-10-CM

## 2020-09-01 LAB
ALBUMIN SERPL-MCNC: 4.1 G/DL (ref 3.5–5.7)
ALP SERPL-CCNC: 50 U/L (ref 34–104)
ALT SERPL W P-5'-P-CCNC: 14 U/L (ref 7–52)
AST SERPL W P-5'-P-CCNC: 18 U/L (ref 13–39)
BILIRUB DIRECT SERPL-MCNC: 0.1 MG/DL (ref 0–0.2)
BILIRUB SERPL-MCNC: 0.5 MG/DL (ref 0.3–1)
PROT SERPL-MCNC: 6.6 G/DL (ref 6.4–8.9)

## 2020-09-01 PROCEDURE — 80076 HEPATIC FUNCTION PANEL: CPT | Mod: ZL | Performed by: FAMILY MEDICINE

## 2020-09-01 PROCEDURE — 36415 COLL VENOUS BLD VENIPUNCTURE: CPT | Mod: ZL | Performed by: FAMILY MEDICINE

## 2020-09-03 ENCOUNTER — ALLIED HEALTH/NURSE VISIT (OUTPATIENT)
Dept: FAMILY MEDICINE | Facility: OTHER | Age: 60
End: 2020-09-03
Attending: FAMILY MEDICINE
Payer: COMMERCIAL

## 2020-09-03 DIAGNOSIS — Z23 NEED FOR ZOSTER VACCINATION: Primary | ICD-10-CM

## 2020-09-03 PROCEDURE — 90750 HZV VACC RECOMBINANT IM: CPT

## 2020-09-03 PROCEDURE — 90471 IMMUNIZATION ADMIN: CPT

## 2020-09-03 NOTE — PROGRESS NOTES
Immunization: Adult  Verified patient's name and . Stated reason for visit today is to receive Shingles vaccine(s). Denied any concerns with previous immunizations. Allergies reviewed.  Screening Questionnaire Adult Immunization completed (see below). VIS handout(s) reviewed and given to take home. Shingrix prepared and administered IM per standing order. Administration documented in IMMUNIZATIONS (see MIIC and order for further information). Instructed to wait in lobby for 15 minutes post-injection and notify RN immediately of any adverse reaction.     Screening Questionnaire for Adult Immunization    Are you sick today?   No   Do you have allergies to medications, food, a vaccine component, or latex?   Yes   Have you ever had a serious reaction after receiving a vaccination?   No   Have you received any vaccinations in the past 4 weeks?   No     Immunization questionnaire was positive for at least one answer.  Allergic to Topiramate, no contraindication with today's vaccine.      Tania Flores RN, BSN on 9/3/2020 at 9:14 AM

## 2020-10-06 DIAGNOSIS — B35.1 ONYCHOMYCOSIS: ICD-10-CM

## 2020-10-06 LAB
ALBUMIN SERPL-MCNC: 4.4 G/DL (ref 3.5–5.7)
ALP SERPL-CCNC: 49 U/L (ref 34–104)
ALT SERPL W P-5'-P-CCNC: 18 U/L (ref 7–52)
AST SERPL W P-5'-P-CCNC: 21 U/L (ref 13–39)
BILIRUB DIRECT SERPL-MCNC: 0.1 MG/DL (ref 0–0.2)
BILIRUB SERPL-MCNC: 0.5 MG/DL (ref 0.3–1)
PROT SERPL-MCNC: 7.1 G/DL (ref 6.4–8.9)

## 2020-10-06 PROCEDURE — 80076 HEPATIC FUNCTION PANEL: CPT | Mod: ZL | Performed by: FAMILY MEDICINE

## 2020-10-06 PROCEDURE — 36415 COLL VENOUS BLD VENIPUNCTURE: CPT | Mod: ZL | Performed by: FAMILY MEDICINE

## 2020-12-03 ENCOUNTER — ALLIED HEALTH/NURSE VISIT (OUTPATIENT)
Dept: FAMILY MEDICINE | Facility: OTHER | Age: 60
End: 2020-12-03
Payer: COMMERCIAL

## 2020-12-03 DIAGNOSIS — Z23 NEED FOR ZOSTER VACCINATION: Primary | ICD-10-CM

## 2020-12-03 PROCEDURE — 90750 HZV VACC RECOMBINANT IM: CPT

## 2020-12-03 PROCEDURE — 90471 IMMUNIZATION ADMIN: CPT

## 2020-12-03 NOTE — PROGRESS NOTES
Immunization: Adult  Verified patient's name and . Stated reason for visit today is to receive shingles vaccine(s). Denied any concerns with previous immunizations. Allergies reviewed.  Screening Questionnaire Adult Immunization completed (see below). VIS handout(s) reviewed and given to take home. Shingrix prepared and administered IM per standing order. Administration documented in IMMUNIZATIONS (see MIIC and order for further information). 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     Screening Questionnaire for Adult Immunization    Are you sick today?   No   Do you have allergies to vaccines or vaccine components?   No   Have you ever had a serious reaction after receiving a vaccination?   No   Have you received any vaccinations in the past 4 weeks?   No     Immunization questionnaire answers were all negative.      Hilary BLACKBURNN, RN on 12/3/2020 at 8:02 AM

## 2021-01-03 ENCOUNTER — HEALTH MAINTENANCE LETTER (OUTPATIENT)
Age: 61
End: 2021-01-03

## 2021-04-15 ENCOUNTER — OFFICE VISIT (OUTPATIENT)
Dept: FAMILY MEDICINE | Facility: OTHER | Age: 61
End: 2021-04-15
Attending: FAMILY MEDICINE
Payer: COMMERCIAL

## 2021-04-15 VITALS
WEIGHT: 220 LBS | SYSTOLIC BLOOD PRESSURE: 122 MMHG | RESPIRATION RATE: 14 BRPM | HEART RATE: 48 BPM | TEMPERATURE: 98.6 F | OXYGEN SATURATION: 100 % | DIASTOLIC BLOOD PRESSURE: 68 MMHG | BODY MASS INDEX: 32.96 KG/M2

## 2021-04-15 DIAGNOSIS — N18.31 STAGE 3A CHRONIC KIDNEY DISEASE (H): ICD-10-CM

## 2021-04-15 DIAGNOSIS — M17.11 PRIMARY OSTEOARTHRITIS OF RIGHT KNEE: Primary | ICD-10-CM

## 2021-04-15 PROBLEM — N18.30 CHRONIC KIDNEY DISEASE, STAGE 3 (H): Status: ACTIVE | Noted: 2021-04-15

## 2021-04-15 PROCEDURE — 99213 OFFICE O/P EST LOW 20 MIN: CPT | Performed by: FAMILY MEDICINE

## 2021-04-15 ASSESSMENT — ANXIETY QUESTIONNAIRES
6. BECOMING EASILY ANNOYED OR IRRITABLE: NOT AT ALL
GAD7 TOTAL SCORE: 0
7. FEELING AFRAID AS IF SOMETHING AWFUL MIGHT HAPPEN: NOT AT ALL
5. BEING SO RESTLESS THAT IT IS HARD TO SIT STILL: NOT AT ALL
2. NOT BEING ABLE TO STOP OR CONTROL WORRYING: NOT AT ALL
3. WORRYING TOO MUCH ABOUT DIFFERENT THINGS: NOT AT ALL
IF YOU CHECKED OFF ANY PROBLEMS ON THIS QUESTIONNAIRE, HOW DIFFICULT HAVE THESE PROBLEMS MADE IT FOR YOU TO DO YOUR WORK, TAKE CARE OF THINGS AT HOME, OR GET ALONG WITH OTHER PEOPLE: NOT DIFFICULT AT ALL
1. FEELING NERVOUS, ANXIOUS, OR ON EDGE: NOT AT ALL

## 2021-04-15 ASSESSMENT — PAIN SCALES - GENERAL: PAINLEVEL: EXTREME PAIN (8)

## 2021-04-15 ASSESSMENT — PATIENT HEALTH QUESTIONNAIRE - PHQ9: 5. POOR APPETITE OR OVEREATING: NOT AT ALL

## 2021-04-15 NOTE — PROGRESS NOTES
"    Assessment & Plan   Problem List Items Addressed This Visit        Urinary    Chronic kidney disease, stage 3      Other Visit Diagnoses     Primary osteoarthritis of right knee    -  Primary         For her knee, ongoing weight loss, tylenol and minimal to no NSAIDS.  Repeat BMP or CMP in July.             BMI:   Estimated body mass index is 32.96 kg/m  as calculated from the following:    Height as of 6/30/20: 1.74 m (5' 8.5\").    Weight as of this encounter: 99.8 kg (220 lb).           No follow-ups on file.    Jeff Traore MD  [facility]    [facility]   Nicole Kerr is a 60 year old who presents for the following health issues     HPI  Right knee injury raking a few weeks ago.  No pain immediately, came on a day later.  Similar to prior meniscal tear.  No locking.  Seems to feel unstable at times while walking.  Is now afraid to go up and down stairs today.  Using a brace, helps slightly.  Has never had knee surgery.      Had an MRI in 2014:    IMPRESSION:     1. Radial tear posterior horn of the medial meniscus.  2. Tricompartmental degenerative changes most severe in the medial  compartment.  3. Popliteal cyst with small joint effusion.              Chanel Cardenas M.D.  [facility].  ATG/jle  D:6/4/2014/T:6/4/2014          Review of Systems         Objective    /68   Pulse (!) 48   Temp 98.6  F (37  C)   Resp 14   Wt 99.8 kg (220 lb)   LMP  (LMP Unknown)   SpO2 100%   Breastfeeding No   BMI 32.96 kg/m    Body mass index is 32.96 kg/m .  Physical Exam  Constitutional:       Appearance: Normal appearance.   Musculoskeletal:      Comments: Right knee without redness and no effusion.  Full range of motion, stable on varus and valgus stressing and negative Drawers.  Negative Shubham's.  Normal exam   Neurological:      General: No focal deficit present.      Mental Status: She is alert and oriented to person, place, and time.                    "

## 2021-04-15 NOTE — NURSING NOTE
"Coming in for an injury to the right knee times tow weeks ago    Chief Complaint   Patient presents with     Knee Pain     injury to the Right knee times two weeks ago       Initial /68   Pulse (!) 48   Temp 98.6  F (37  C)   Resp 14   Wt 99.8 kg (220 lb)   LMP  (LMP Unknown)   SpO2 100%   Breastfeeding No   BMI 32.96 kg/m   Estimated body mass index is 32.96 kg/m  as calculated from the following:    Height as of 6/30/20: 1.74 m (5' 8.5\").    Weight as of this encounter: 99.8 kg (220 lb).  Medication Reconciliation: complete    Kallie Thibodeaux, LPN  "

## 2021-04-16 ASSESSMENT — ANXIETY QUESTIONNAIRES: GAD7 TOTAL SCORE: 0

## 2021-07-15 DIAGNOSIS — G43.909 MIGRAINE WITHOUT STATUS MIGRAINOSUS, NOT INTRACTABLE, UNSPECIFIED MIGRAINE TYPE: ICD-10-CM

## 2021-07-16 DIAGNOSIS — G43.909 MIGRAINE WITHOUT STATUS MIGRAINOSUS, NOT INTRACTABLE, UNSPECIFIED MIGRAINE TYPE: ICD-10-CM

## 2021-07-16 RX ORDER — PROPRANOLOL HYDROCHLORIDE 20 MG/1
20 TABLET ORAL DAILY
Qty: 90 TABLET | Refills: 3 | Status: SHIPPED | OUTPATIENT
Start: 2021-07-16 | End: 2022-08-29

## 2021-07-16 RX ORDER — RIZATRIPTAN BENZOATE 10 MG/1
10 TABLET, ORALLY DISINTEGRATING ORAL PRN
Qty: 10 TABLET | Refills: 3 | Status: SHIPPED | OUTPATIENT
Start: 2021-07-16 | End: 2022-04-14

## 2021-07-16 NOTE — TELEPHONE ENCOUNTER
Prescription refilled per RN Medication Refill Policy..................Rubina Leonard RN 7/16/2021 10:49 AM

## 2021-07-16 NOTE — TELEPHONE ENCOUNTER
Prescription refilled per RN Medication Refill Policy..................Rubina Leonard RN 7/16/2021 4:20 PM

## 2021-07-23 ENCOUNTER — MYC MEDICAL ADVICE (OUTPATIENT)
Dept: FAMILY MEDICINE | Facility: OTHER | Age: 61
End: 2021-07-23

## 2021-07-23 DIAGNOSIS — Z13.220 SCREENING FOR LIPID DISORDERS: Primary | ICD-10-CM

## 2021-07-23 DIAGNOSIS — Z13.1 SCREENING FOR DIABETES MELLITUS: ICD-10-CM

## 2021-07-26 ENCOUNTER — HOSPITAL ENCOUNTER (OUTPATIENT)
Dept: MAMMOGRAPHY | Facility: OTHER | Age: 61
Discharge: HOME | End: 2021-07-26
Attending: FAMILY MEDICINE | Admitting: FAMILY MEDICINE
Payer: COMMERCIAL

## 2021-07-26 DIAGNOSIS — Z12.31 VISIT FOR SCREENING MAMMOGRAM: ICD-10-CM

## 2021-07-26 PROCEDURE — 77067 SCR MAMMO BI INCL CAD: CPT

## 2021-08-10 ENCOUNTER — OFFICE VISIT (OUTPATIENT)
Dept: FAMILY MEDICINE | Facility: OTHER | Age: 61
End: 2021-08-10
Attending: FAMILY MEDICINE
Payer: COMMERCIAL

## 2021-08-10 ENCOUNTER — HOSPITAL ENCOUNTER (OUTPATIENT)
Dept: GENERAL RADIOLOGY | Facility: OTHER | Age: 61
End: 2021-08-10
Attending: FAMILY MEDICINE
Payer: COMMERCIAL

## 2021-08-10 VITALS
TEMPERATURE: 97.9 F | SYSTOLIC BLOOD PRESSURE: 120 MMHG | OXYGEN SATURATION: 98 % | DIASTOLIC BLOOD PRESSURE: 72 MMHG | BODY MASS INDEX: 29.44 KG/M2 | RESPIRATION RATE: 16 BRPM | HEART RATE: 66 BPM | HEIGHT: 69 IN | WEIGHT: 198.8 LBS

## 2021-08-10 DIAGNOSIS — M25.511 ACUTE PAIN OF RIGHT SHOULDER: ICD-10-CM

## 2021-08-10 DIAGNOSIS — M25.511 ACUTE PAIN OF RIGHT SHOULDER: Primary | ICD-10-CM

## 2021-08-10 PROCEDURE — 99213 OFFICE O/P EST LOW 20 MIN: CPT | Performed by: FAMILY MEDICINE

## 2021-08-10 PROCEDURE — 73030 X-RAY EXAM OF SHOULDER: CPT | Mod: RT

## 2021-08-10 RX ORDER — NAPROXEN 500 MG/1
500 TABLET ORAL 2 TIMES DAILY WITH MEALS
Qty: 60 TABLET | Refills: 3 | Status: SHIPPED | OUTPATIENT
Start: 2021-08-10 | End: 2022-08-29

## 2021-08-10 ASSESSMENT — MIFFLIN-ST. JEOR: SCORE: 1523.19

## 2021-08-10 ASSESSMENT — PAIN SCALES - GENERAL: PAINLEVEL: EXTREME PAIN (8)

## 2021-08-10 NOTE — NURSING NOTE
Patient presents today for right shoulder pain that started earlier this summer.    Medication Reconciliation Complete    Trinity Trujillo LPN  8/10/2021 3:47 PM

## 2021-08-10 NOTE — PROGRESS NOTES
"SUBJECTIVE:  Nicole Perera is a 60 year old female here for right shoulder pain.  She reports over the summer she has been attempting to lose weight and she has been doing some light exercises with weights.  Over time she started developing lateral pain in her right shoulder.  She decreased her weight lifting.  Recently however she has been doing overuse activity as she has been helping her son move back home.  Last night she was doing some overhead work for approximate 2 hours and developed worsening pain as well.  She does not recall any trauma.  She is right-hand dominant.  No previous injuries or problems with her right shoulder.    ROS:    As above otherwise ROS is unremarkable.    OBJECTIVE:  /72   Pulse 66   Temp 97.9  F (36.6  C)   Resp 16   Ht 1.74 m (5' 8.5\")   Wt 90.2 kg (198 lb 12.8 oz)   LMP  (LMP Unknown)   SpO2 98%   BMI 29.79 kg/m      EXAM:  General Appearance: Pleasant, alert, appropriate appearance for age. No acute distress  Musculoskeletal: Right shoulder shows normal flexion, abduction and internal rotation.  She has some swelling approximately 3 to 4 cm in length and bruising noted near her distal deltoid over her lateral shoulder.  She has pain with resisted supraspinatus and external rotation.  Internal rotation strength testing is normal.  No pain with Valenzuela or Neer's.  Normal biceps testing.  Skin: Bruising as discussed above.  Neurologic Exam: No focal motor or sensory deficits.    ASSESSEMENT AND PLAN:    1. Acute pain of right shoulder      X-rays were performed, personally reviewed and shows no acute bony abnormality.  Suspect that her symptoms are likely due to rotator cuff dysfunction I do not suspect any complete tears.  At this time we will try to help with her symptoms including icing often and naproxen twice daily with meals.  If her symptoms are improving she will titrate off naproxen as able and increase activity as she can tolerate.  Over the next few weeks " if she is having no improvement with her symptoms despite the above measures would consider MR arthrogram of her right shoulder.    Dereje Carmona MD  Family Medicine

## 2021-08-14 ENCOUNTER — HEALTH MAINTENANCE LETTER (OUTPATIENT)
Age: 61
End: 2021-08-14

## 2021-08-25 ENCOUNTER — LAB (OUTPATIENT)
Dept: LAB | Facility: OTHER | Age: 61
End: 2021-08-25
Attending: FAMILY MEDICINE
Payer: COMMERCIAL

## 2021-08-25 DIAGNOSIS — Z13.1 SCREENING FOR DIABETES MELLITUS: ICD-10-CM

## 2021-08-25 DIAGNOSIS — Z13.220 SCREENING FOR LIPID DISORDERS: ICD-10-CM

## 2021-08-25 LAB
ALBUMIN SERPL-MCNC: 4.4 G/DL (ref 3.5–5.7)
ALP SERPL-CCNC: 58 U/L (ref 34–104)
ALT SERPL W P-5'-P-CCNC: 12 U/L (ref 7–52)
ANION GAP SERPL CALCULATED.3IONS-SCNC: 7 MMOL/L (ref 3–14)
AST SERPL W P-5'-P-CCNC: 16 U/L (ref 13–39)
BILIRUB SERPL-MCNC: 0.7 MG/DL (ref 0.3–1)
BUN SERPL-MCNC: 21 MG/DL (ref 7–25)
CALCIUM SERPL-MCNC: 9.9 MG/DL (ref 8.6–10.3)
CHLORIDE BLD-SCNC: 105 MMOL/L (ref 98–107)
CHOLEST SERPL-MCNC: 220 MG/DL
CO2 SERPL-SCNC: 28 MMOL/L (ref 21–31)
CREAT SERPL-MCNC: 1.02 MG/DL (ref 0.6–1.2)
FASTING STATUS PATIENT QL REPORTED: YES
GFR SERPL CREATININE-BSD FRML MDRD: 60 ML/MIN/1.73M2
GLUCOSE BLD-MCNC: 97 MG/DL (ref 70–105)
HDLC SERPL-MCNC: 44 MG/DL (ref 23–92)
LDLC SERPL CALC-MCNC: 159 MG/DL
NONHDLC SERPL-MCNC: 176 MG/DL
POTASSIUM BLD-SCNC: 4.2 MMOL/L (ref 3.5–5.1)
PROT SERPL-MCNC: 6.7 G/DL (ref 6.4–8.9)
SODIUM SERPL-SCNC: 140 MMOL/L (ref 134–144)
TRIGL SERPL-MCNC: 85 MG/DL

## 2021-08-25 PROCEDURE — 80061 LIPID PANEL: CPT | Mod: ZL

## 2021-08-25 PROCEDURE — 36415 COLL VENOUS BLD VENIPUNCTURE: CPT | Mod: ZL

## 2021-08-25 PROCEDURE — 80053 COMPREHEN METABOLIC PANEL: CPT | Mod: ZL

## 2021-08-26 ENCOUNTER — OFFICE VISIT (OUTPATIENT)
Dept: FAMILY MEDICINE | Facility: OTHER | Age: 61
End: 2021-08-26
Attending: FAMILY MEDICINE
Payer: COMMERCIAL

## 2021-08-26 VITALS
DIASTOLIC BLOOD PRESSURE: 72 MMHG | SYSTOLIC BLOOD PRESSURE: 104 MMHG | OXYGEN SATURATION: 98 % | RESPIRATION RATE: 16 BRPM | BODY MASS INDEX: 30.01 KG/M2 | TEMPERATURE: 98 F | WEIGHT: 198 LBS | HEIGHT: 68 IN | HEART RATE: 61 BPM

## 2021-08-26 DIAGNOSIS — Z00.00 HEALTH CARE MAINTENANCE: Primary | ICD-10-CM

## 2021-08-26 DIAGNOSIS — L50.3 DERMATOGRAPHISM: ICD-10-CM

## 2021-08-26 PROBLEM — N18.30 CHRONIC KIDNEY DISEASE, STAGE 3 (H): Status: RESOLVED | Noted: 2021-04-15 | Resolved: 2021-08-26

## 2021-08-26 PROCEDURE — 99396 PREV VISIT EST AGE 40-64: CPT | Performed by: FAMILY MEDICINE

## 2021-08-26 RX ORDER — TRIAMCINOLONE ACETONIDE 1 MG/G
CREAM TOPICAL
Qty: 45 G | Refills: 1 | Status: SHIPPED | OUTPATIENT
Start: 2021-08-26 | End: 2022-08-29

## 2021-08-26 RX ORDER — DIPHENHYDRAMINE HCL 25 MG
25 CAPSULE ORAL
COMMUNITY

## 2021-08-26 RX ORDER — CETIRIZINE HYDROCHLORIDE 10 MG/1
10 TABLET ORAL DAILY
COMMUNITY
End: 2022-08-29

## 2021-08-26 ASSESSMENT — MIFFLIN-ST. JEOR: SCORE: 1515.59

## 2021-08-26 ASSESSMENT — ENCOUNTER SYMPTOMS
COUGH: 0
CHILLS: 0
SHORTNESS OF BREATH: 0
FEVER: 0
NERVOUS/ANXIOUS: 0

## 2021-08-26 ASSESSMENT — PAIN SCALES - GENERAL: PAINLEVEL: NO PAIN (0)

## 2021-08-26 NOTE — PROGRESS NOTES
"  SUBJECTIVE:   Nursing Notes:   Honey Chandler LPN  8/26/2021  1:14 PM  Sign at exiting of workspace  Chief Complaint   Patient presents with     Physical       Initial /72   Pulse 61   Temp 98  F (36.7  C) (Temporal)   Resp 16   Ht 1.734 m (5' 8.25\")   Wt 89.8 kg (198 lb)   LMP  (LMP Unknown)   SpO2 98%   Breastfeeding No   BMI 29.89 kg/m   Estimated body mass index is 29.89 kg/m  as calculated from the following:    Height as of this encounter: 1.734 m (5' 8.25\").    Weight as of this encounter: 89.8 kg (198 lb).  Medication Reconciliation: complete    FOOD SECURITY SCREENING QUESTIONS  Hunger Vital Signs:  Within the past 12 months we worried whether our food would run out before we got money to buy more. Never  Within the past 12 months the food we bought just didn't last and we didn't have money to get more. Never    Honey Chandler LPN      Nicole Perera is a 60 year old female who presents to clinic today for a physical.    Has had itching on her feet and hands.  Started a week ago.  Used a new laundry soap.  Also went swimming at [employer] at that time.  She also has been taking a beet supplement.  She did a lot of laundry last week.   She does have some seasonal allergies as well.  She uses afrin daily.      Has been working on weight loss.  Has lost about 40 lb by intermittent fasting.    HPI    I personally reviewed medications/allergies/history listed below:    Patient Active Problem List    Diagnosis Date Noted     Fibrocystic breast disease 02/13/2018     Priority: Medium     Migraine headache 02/13/2018     Priority: Medium     Dyspareunia (CODE) 02/16/2015     Priority: Medium     Rosacea 02/16/2015     Priority: Medium     Past Medical History:   Diagnosis Date     Abnormal cytological finding in specimen from cervix uteri     Hx of abnormal Pap with atypical squamous colp 1999 showed benign reactive  epithelial changes, subsequent Paps have been normal.     Contact with " and suspected exposure to communicable disease     Bat bite, left side of neck     Personal history of other medical treatment (CODE)          Personal history of other medical treatment (CODE)     HX of abnormal Pap with atypical squamous colp  showed benign reactive epithelial changes, subsequent Paps have been normal      Past Surgical History:   Procedure Laterality Date     APPENDECTOMY OPEN      No Comments Provided     BIOPSY BREAST      No Comments Provided      SECTION Right 1993    Rt breast biopsy for fibrocystic breast disease~     COLONOSCOPY  2010     normal  Next due .     ESOPHAGOSCOPY, GASTROSCOPY, DUODENOSCOPY (EGD), COMBINED  2007    Esophagogastroduodenoscopy     Family History   Problem Relation Age of Onset     Heart Disease Father 50        Heart Disease     Other - See Comments Father         Stroke     Other - See Comments Maternal Aunt         Migraines     Thyroid Disease Mother         Thyroid Disease,Thyroid disorder -after being stepped on by a cow     Diabetes Mother         Diabetes     Diabetes Brother         Diabetes     Other - See Comments Sister         CP     Substance Abuse Brother 44        Alcohol/Drug, ETOHism-     Social History     Tobacco Use     Smoking status: Never Smoker     Smokeless tobacco: Never Used   Substance Use Topics     Alcohol use: Yes     Alcohol/week: 1.0 standard drinks     Comment: occasional 1/week     Social History     Social History Narrative    Does not smoke, guns at home are locked, minimal ETOH, does use sunscreen, uses seat belts 100% of the time.    .  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  Has a degree in health information management.    Arthur - son - in an RN working in public health.    Antonio -      Current Outpatient Medications   Medication Sig Dispense Refill     aspirin EC 81 MG EC tablet Take 81 mg by mouth daily       calcium-vitamin D (CALTRATE) 600-400 MG-UNIT per tablet  "Take 1 tablet by mouth 2 times daily (with meals)       cetirizine (ZYRTEC) 10 MG tablet Take 10 mg by mouth daily       diphenhydrAMINE (ALLERGY RELIEF) 25 MG capsule Take 25 mg by mouth every 6 hours as needed for itching or allergies       fish oil-omega-3 fatty acids 1000 MG capsule Take 1 capsule by mouth daily       MAGNESIUM OXIDE PO Take 400 mg by mouth       Multiple Vitamin (MULTI-VITAMINS) TABS Take 1 tablet by mouth daily       naproxen (NAPROSYN) 500 MG tablet Take 1 tablet (500 mg) by mouth 2 times daily (with meals) 60 tablet 3     propranolol (INDERAL) 20 MG tablet Take 1 tablet (20 mg) by mouth daily 90 tablet 3     rizatriptan (MAXALT-MLT) 10 MG ODT Take 1 tablet (10 mg) by mouth as needed 10 tablet 3     triamcinolone (KENALOG) 0.1 % external cream Apply sparingly to affected area twice daily for 14 days. 45 g 1     vitamin B complex with vitamin C (VITAMIN  B COMPLEX) tablet Take 1 tablet by mouth daily       Vitamin D-Vitamin K (K2 PLUS D3 PO) Take by mouth daily       Allergies   Allergen Reactions     Topiramate      Other reaction(s): Dizziness       Review of Systems   Constitutional: Negative for chills and fever.   Respiratory: Negative for cough and shortness of breath.    Cardiovascular: Negative for peripheral edema.   Psychiatric/Behavioral: Negative for mood changes. The patient is not nervous/anxious.         OBJECTIVE:     /72   Pulse 61   Temp 98  F (36.7  C) (Temporal)   Resp 16   Ht 1.734 m (5' 8.25\")   Wt 89.8 kg (198 lb)   LMP  (LMP Unknown)   SpO2 98%   Breastfeeding No   BMI 29.89 kg/m    Body mass index is 29.89 kg/m .  Physical Exam  Constitutional:       General: She is not in acute distress.     Appearance: Normal appearance. She is well-developed.   HENT:      Head: Normocephalic.      Right Ear: Tympanic membrane and external ear normal.      Left Ear: Tympanic membrane and external ear normal.      Nose: Nose normal.      Mouth/Throat:      Pharynx: No " oropharyngeal exudate.   Eyes:      General:         Right eye: No discharge.         Left eye: No discharge.      Conjunctiva/sclera: Conjunctivae normal.      Pupils: Pupils are equal, round, and reactive to light.   Neck:      Thyroid: No thyromegaly.      Trachea: No tracheal deviation.   Cardiovascular:      Rate and Rhythm: Normal rate and regular rhythm.      Pulses: Normal pulses.      Heart sounds: Normal heart sounds, S1 normal and S2 normal. No murmur heard.   No friction rub. No gallop. No S3 or S4 sounds.    Pulmonary:      Effort: Pulmonary effort is normal. No respiratory distress.      Breath sounds: Normal breath sounds. No wheezing or rales.      Comments: Breast exam:  No masses palpable bilaterally.  No skin changes, tethering or axillary lymphadenopathy bilaterally.    Abdominal:      General: Bowel sounds are normal. There is no distension.      Palpations: Abdomen is soft. There is no mass.      Tenderness: There is no abdominal tenderness.   Genitourinary:     Comments: Pelvic/Rectal exams deferred per patient.  Musculoskeletal:         General: Normal range of motion.      Cervical back: Neck supple.   Lymphadenopathy:      Cervical: No cervical adenopathy.   Skin:     General: Skin is warm and dry.      Findings: No rash.      Comments: Dermatographism noted on her distal arms and legs.   Neurological:      Mental Status: She is alert and oriented to person, place, and time.      Motor: No abnormal muscle tone.      Deep Tendon Reflexes: Reflexes are normal and symmetric.   Psychiatric:         Thought Content: Thought content normal.         Judgment: Judgment normal.           PHQ-2 Score:     PHQ-2 ( 1999 Pfizer) 8/26/2021 4/15/2021   Q1: Little interest or pleasure in doing things 0 0   Q2: Feeling down, depressed or hopeless 0 0   PHQ-2 Score 0 0       JOHNNY-7 SCORE 4/15/2021   Total Score 0       I personally reviewed results withpatient as listed below:   Diagnostic Test  Results:    Recent Labs   Lab Test 08/25/21  0731 07/01/20  0715   CHOL 220* 216*   HDL 44 42   * 143*   TRIG 85 156*     Last Comprehensive Metabolic Panel:  Sodium   Date Value Ref Range Status   08/25/2021 140 134 - 144 mmol/L Final   07/01/2020 139 134 - 144 mmol/L Final     Potassium   Date Value Ref Range Status   08/25/2021 4.2 3.5 - 5.1 mmol/L Final   07/01/2020 4.0 3.5 - 5.1 mmol/L Final     Chloride   Date Value Ref Range Status   08/25/2021 105 98 - 107 mmol/L Final   07/01/2020 104 98 - 107 mmol/L Final     Carbon Dioxide   Date Value Ref Range Status   07/01/2020 26 21 - 31 mmol/L Final     Carbon Dioxide (CO2)   Date Value Ref Range Status   08/25/2021 28 21 - 31 mmol/L Final     Anion Gap   Date Value Ref Range Status   08/25/2021 7 3 - 14 mmol/L Final   07/01/2020 9 3 - 14 mmol/L Final     Glucose   Date Value Ref Range Status   08/25/2021 97 70 - 105 mg/dL Final   07/01/2020 108 (H) 70 - 105 mg/dL Final     Urea Nitrogen   Date Value Ref Range Status   08/25/2021 21 7 - 25 mg/dL Final   07/01/2020 20 7 - 25 mg/dL Final     Creatinine   Date Value Ref Range Status   08/25/2021 1.02 0.60 - 1.20 mg/dL Final   07/01/2020 1.05 0.60 - 1.20 mg/dL Final     GFR Estimate   Date Value Ref Range Status   08/25/2021 60 (L) >60 mL/min/1.73m2 Final     Comment:     As of July 11, 2021, eGFR is calculated by the CKD-EPI creatinine equation, without race adjustment. eGFR can be influenced by muscle mass, exercise, and diet. The reported eGFR is an estimation only and is only applicable if the renal function is stable.   07/01/2020 53 (L) >60 mL/min/[1.73_m2] Final     Calcium   Date Value Ref Range Status   08/25/2021 9.9 8.6 - 10.3 mg/dL Final   07/01/2020 9.7 8.6 - 10.3 mg/dL Final     Liver Function Studies - Recent Labs   Lab Test 08/25/21  0731   PROTTOTAL 6.7   ALBUMIN 4.4   BILITOTAL 0.7   ALKPHOS 58   AST 16   ALT 12           ASSESSMENT/PLAN:       ICD-10-CM    1. Health care maintenance  Z00.00     2. 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  Mammogram is up to date, last completed on 7/26/2021.  DEXA is up-to-date, last completed 5/20/2019 and was normal.  Guard last completed 7/7/2020 and was negative.  Pap/HPV were last completed on 4/3/2018 and both were normal at that time.  Tdap is up-to-date, last completed 4/3/2018.  Shingrix is up-to-date.  She also has completed the Covid series.  2.  Recommended taking Zyrtec daily for the next couple of weeks to see if this helps.  Also recommended switching back to her usual laundry detergent and stopping the recent supplement that she had started.  Monitor to see if this eliminates the issue.  If not, she will find me and would refer to dermatology at that time.  Also cautioned on avoiding daily use of Afrin as she will likely have rebound nasal stuffiness if she uses this on a daily basis.    Lillie Saldaña MD  [facility]      The 10-year ASCVD risk score (Braden VELA Jr., et al., 2013) is: 3%    Values used to calculate the score:      Age: 60 years      Sex: Female      Is Non- : No      Diabetic: No      Tobacco smoker: No      Systolic Blood Pressure: 104 mmHg      Is BP treated: No      HDL Cholesterol: 44 mg/dL      Total Cholesterol: 220 mg/dL

## 2021-08-26 NOTE — NURSING NOTE
"Chief Complaint   Patient presents with     Physical       Initial /72   Pulse 61   Temp 98  F (36.7  C) (Temporal)   Resp 16   Ht 1.734 m (5' 8.25\")   Wt 89.8 kg (198 lb)   LMP  (LMP Unknown)   SpO2 98%   Breastfeeding No   BMI 29.89 kg/m   Estimated body mass index is 29.89 kg/m  as calculated from the following:    Height as of this encounter: 1.734 m (5' 8.25\").    Weight as of this encounter: 89.8 kg (198 lb).  Medication Reconciliation: complete    FOOD SECURITY SCREENING QUESTIONS  Hunger Vital Signs:  Within the past 12 months we worried whether our food would run out before we got money to buy more. Never  Within the past 12 months the food we bought just didn't last and we didn't have money to get more. Never    Honey Chandler, BRITTANY  "

## 2021-10-09 ENCOUNTER — HEALTH MAINTENANCE LETTER (OUTPATIENT)
Age: 61
End: 2021-10-09

## 2021-10-15 ENCOUNTER — LAB REQUISITION (OUTPATIENT)
Dept: LAB | Facility: OTHER | Age: 61
End: 2021-10-15

## 2021-10-15 DIAGNOSIS — Z11.52 ENCOUNTER FOR SCREENING FOR COVID-19: ICD-10-CM

## 2021-10-15 LAB — SARS-COV-2 RNA RESP QL NAA+PROBE: NEGATIVE

## 2021-10-15 PROCEDURE — U0005 INFEC AGEN DETEC AMPLI PROBE: HCPCS | Performed by: FAMILY MEDICINE

## 2021-10-15 NOTE — PROGRESS NOTES
HPI:    Nicole Perera is a 57 year old female  who presents to clinic today for UTI.   Strong urine odor and urgency for the past couple of weeks.  Dysuria started this morning.  No blood in urine.  No fevers or chills.  No nausea or vomiting.  No change in appetite.  No abdominal cramping  or pressure.  No diarrhea or constipation.  No back pain.  Vaginal itching x 1 days about 3 days ago.  No vaginal discharge.   Drinking cranberry juice.  No OTC treatments.  Last UTI , treated with Macrobid.          Past Medical History:   Diagnosis Date     Abnormal cytological finding in specimen from cervix uteri     Hx of abnormal Pap with atypical squamous colp  showed benign reactive  epithelial changes, subsequent Paps have been normal.     Contact with and suspected exposure to communicable disease     Bat bite, left side of neck     Personal history of other medical treatment (CODE)          Personal history of other medical treatment (CODE)     HX of abnormal Pap with atypical squamous colp  showed benign reactive epithelial changes, subsequent Paps have been normal     Past Surgical History:   Procedure Laterality Date     APPENDECTOMY OPEN      No Comments Provided     BIOPSY BREAST      No Comments Provided      SECTION      ,Rt breast biopsy for fibrocystic breast disease~     COLONOSCOPY      2010,Colonoscopy-  normal  Next due .     ESOPHAGOSCOPY, GASTROSCOPY, DUODENOSCOPY (EGD), COMBINED      07,Esophagogastroduodenoscopy     Social History   Substance Use Topics     Smoking status: Never Smoker     Smokeless tobacco: Never Used     Alcohol use 0.6 oz/week     Current Outpatient Prescriptions   Medication Sig Dispense Refill     nitroFURantoin, macrocrystal-monohydrate, (MACROBID) 100 MG capsule Take 1 capsule (100 mg) by mouth 2 times daily for 5 days 10 capsule 0     aspirin EC 81 MG EC tablet Take 81 mg by mouth daily       calcium-vitamin D (CALTRATE) 600-400  "MG-UNIT per tablet Take 1 tablet by mouth 2 times daily (with meals)       conjugated estrogens (PREMARIN) cream daily       ibuprofen (ADVIL/MOTRIN) 200 MG tablet Take 600 mg by mouth every 6 hours as needed for pain       Multiple Vitamin (MULTI-VITAMINS) TABS Take 1 tablet by mouth daily       fish oil-omega-3 fatty acids 1000 MG capsule Take 1 capsule by mouth daily       propranolol (INDERAL) 20 MG tablet Take 20 mg by mouth daily       rizatriptan (MAXALT-MLT) 10 MG ODT tab Take 1 tablet by mouth as needed       Allergies   Allergen Reactions     Topiramate      Other reaction(s): Dizziness         Past medical history, past surgical history, current medications and allergies reviewed and accurate to the best of my knowledge.        ROS:  Refer to HPI    /70 (BP Location: Right arm, Patient Position: Sitting, Cuff Size: Adult Large)  Pulse 68  Temp 98.4  F (36.9  C) (Tympanic)  Resp 16  Ht 5' 9\" (1.753 m)  Wt 236 lb (107 kg)  BMI 34.85 kg/m2    EXAM:  General Appearance: Well appearing adult female, appropriate appearance for age. No acute distress  Eyes: conjunctivae normal without erythema or irritation, no drainage or crusting, no eyelid swelling, pupils equal   Orophayrnx: moist mucous membranes, posterior pharynx without erythema, tonsils without hypertrophy, no erythema, no exudates or petechiae, no post nasal drip seen.    Respiratory: normal chest wall and respirations.  Normal effort.  Clear to auscultation bilaterally, no wheezing, crackles or rhonchi.  No increased work of breathing.  No cough appreciated.  Cardiac: RRR with no murmurs  Abdomen: soft, nontender, no masses or organomegally, no rebound tenderness or guarding, normal bowel sounds present  :  No suprapubic tenderness to palpation.  No CVA tenderness to palpation.    Musculoskeletal:  Normal gait.  Equal movement of bilateral upper extremities.  Equal movement of bilateral lower extremities.    Dermatological: no rashes " noted of exposed skin  Psychological: normal affect, alert and pleasant      Labs:  Results for orders placed or performed in visit on 03/16/18   *UA reflex to Microscopic   Result Value Ref Range    Color Urine Yellow     Appearance Urine Clear     Glucose Urine Negative NEG^Negative mg/dL    Bilirubin Urine Negative NEG^Negative    Ketones Urine Negative NEG^Negative mg/dL    Specific Gravity Urine <1.005 1.003 - 1.035    Blood Urine Negative NEG^Negative    pH Urine 7.0 5.0 - 7.0 pH    Protein Albumin Urine Negative NEG^Negative mg/dL    Urobilinogen Urine 0.2 0.2 - 1.0 EU/dL    Nitrite Urine Negative NEG^Negative    Leukocyte Esterase Urine Moderate (A) NEG^Negative    Source Midstream Urine    Urine Microscopic   Result Value Ref Range    WBC Urine 5-10 (A) OTO5^0 - 5 /HPF    RBC Urine O - 2 OTO2^O - 2 /HPF    Bacteria Urine Few (A) NEG^Negative /HPF           ASSESSMENT/PLAN:    ICD-10-CM    1. Acute cystitis without hematuria N30.00 Urine Culture Aerobic Bacterial     nitroFURantoin, macrocrystal-monohydrate, (MACROBID) 100 MG capsule   2. Urinary problem R39.89 *UA reflex to Microscopic     Urine Microscopic   3. Dysuria R30.0            Urinalysis - few bacteria, few WBCs  Urine culture pending    Macrobid 100 mg BID x 5 days     Encouraged fluids and frequent bladder emptying.  May use Pyridium OTC PRN.       Will call if culture warrants change of abx.   Call or return to clinic PRN if these symptoms worsen or fail to improve as anticipated.            2.38

## 2021-12-17 ENCOUNTER — OFFICE VISIT (OUTPATIENT)
Dept: FAMILY MEDICINE | Facility: OTHER | Age: 61
End: 2021-12-17
Attending: PHYSICIAN ASSISTANT
Payer: COMMERCIAL

## 2021-12-17 VITALS
TEMPERATURE: 98 F | HEART RATE: 73 BPM | BODY MASS INDEX: 29.73 KG/M2 | DIASTOLIC BLOOD PRESSURE: 62 MMHG | WEIGHT: 197 LBS | RESPIRATION RATE: 16 BRPM | SYSTOLIC BLOOD PRESSURE: 104 MMHG | OXYGEN SATURATION: 98 %

## 2021-12-17 DIAGNOSIS — R39.9 UTI SYMPTOMS: ICD-10-CM

## 2021-12-17 DIAGNOSIS — R39.89 SUSPECTED UTI: Primary | ICD-10-CM

## 2021-12-17 LAB
ALBUMIN UR-MCNC: NEGATIVE MG/DL
APPEARANCE UR: ABNORMAL
BILIRUB UR QL STRIP: NEGATIVE
COLOR UR AUTO: YELLOW
GLUCOSE UR STRIP-MCNC: NEGATIVE MG/DL
HGB UR QL STRIP: NEGATIVE
KETONES UR STRIP-MCNC: NEGATIVE MG/DL
LEUKOCYTE ESTERASE UR QL STRIP: NEGATIVE
NITRATE UR QL: NEGATIVE
PH UR STRIP: 7.5 [PH] (ref 5–9)
RBC URINE: <1 /HPF
SP GR UR STRIP: 1.02 (ref 1–1.03)
UROBILINOGEN UR STRIP-MCNC: NORMAL MG/DL
WBC URINE: 1 /HPF

## 2021-12-17 PROCEDURE — 81001 URINALYSIS AUTO W/SCOPE: CPT | Mod: ZL | Performed by: NURSE PRACTITIONER

## 2021-12-17 PROCEDURE — 87086 URINE CULTURE/COLONY COUNT: CPT | Mod: ZL | Performed by: NURSE PRACTITIONER

## 2021-12-17 PROCEDURE — 99213 OFFICE O/P EST LOW 20 MIN: CPT | Performed by: NURSE PRACTITIONER

## 2021-12-17 RX ORDER — NITROFURANTOIN 25; 75 MG/1; MG/1
100 CAPSULE ORAL 2 TIMES DAILY
Qty: 10 CAPSULE | Refills: 0 | Status: SHIPPED | OUTPATIENT
Start: 2021-12-17 | End: 2021-12-22

## 2021-12-17 ASSESSMENT — PAIN SCALES - GENERAL: PAINLEVEL: MILD PAIN (3)

## 2021-12-17 NOTE — PROGRESS NOTES
ASSESSMENT/PLAN:     I have reviewed the nursing notes.  I have reviewed the findings, diagnosis, plan and need for follow up with the patient.      1. UTI symptoms    - UA reflex to Microscopic and Culture    2. Suspected UTI    - Urine Culture Aerobic Bacterial    - nitroFURantoin macrocrystal-monohydrate (MACROBID) 100 MG capsule; Take 1 capsule (100 mg) by mouth 2 times daily for 5 days  Dispense: 10 capsule; Refill: 0    Hx of E.Coli UTIs, last 2020 and 2018    Urinalysis - slightly cloudy, negative blood, negative nitrite, negative leukocyte estrace  Urine microscopic - RBC <1, WBC 1, no noted bacteria     Urine culture pending  Will call if culture warrants change of abx.     May use Pyridium OTC PRN.     Encouraged fluids and frequent bladder emptying.    Discussed warning signs/symptoms indicative of need to f/u  Follow up if symptoms persist or worsen or concerns      I explained my diagnostic considerations and recommendations to the patient, who voiced understanding and agreement with the treatment plan. All questions were answered. We discussed potential side effects of any prescribed or recommended therapies, as well as expectations for response to treatments.    Kassidy Page NP  [facility]      SUBJECTIVE:   Nicole Perera is a 61 year old female who presents to clinic today for the following health issues:  Possible UTI    HPI  Urinary frequency, voiding small amounts, burning when sitting for the past 4 days.  No hematuria.    No fevers or chills.  No nausea, vomiting.  No change in appetite.  No abdominal pain, cramping, or pressure.  No back pain.  No vaginal itching, discharge or lesions.  No change in bowel pattern or movements.  No hx of kidney stones.  Hx of infrequent UTIs, last 7/13/2020, culture grew out E Coli and previous was 3/16/2018 with culture positive E Coli.  No OTC medications.    Past Medical History:   Diagnosis Date     Abnormal cytological finding  in specimen from cervix uteri     Hx of abnormal Pap with atypical squamous colp  showed benign reactive  epithelial changes, subsequent Paps have been normal.     Contact with and suspected exposure to communicable disease     Bat bite, left side of neck     Personal history of other medical treatment (CODE)          Personal history of other medical treatment (CODE)     HX of abnormal Pap with atypical squamous colp  showed benign reactive epithelial changes, subsequent Paps have been normal     Past Surgical History:   Procedure Laterality Date     APPENDECTOMY OPEN      No Comments Provided     BIOPSY BREAST      No Comments Provided      SECTION Right 1993    Rt breast biopsy for fibrocystic breast disease~     COLONOSCOPY  2010     normal  Next due .     ESOPHAGOSCOPY, GASTROSCOPY, DUODENOSCOPY (EGD), COMBINED  2007    Esophagogastroduodenoscopy     Social History     Tobacco Use     Smoking status: Never Smoker     Smokeless tobacco: Never Used   Substance Use Topics     Alcohol use: Yes     Alcohol/week: 1.0 standard drink     Comment: occasional 1/week     Current Outpatient Medications   Medication Sig Dispense Refill     aspirin EC 81 MG EC tablet Take 81 mg by mouth daily       calcium-vitamin D (CALTRATE) 600-400 MG-UNIT per tablet Take 1 tablet by mouth 2 times daily (with meals)       cetirizine (ZYRTEC) 10 MG tablet Take 10 mg by mouth daily       diphenhydrAMINE (ALLERGY RELIEF) 25 MG capsule Take 25 mg by mouth every 6 hours as needed for itching or allergies       fish oil-omega-3 fatty acids 1000 MG capsule Take 1 capsule by mouth daily       MAGNESIUM OXIDE PO Take 400 mg by mouth       Multiple Vitamin (MULTI-VITAMINS) TABS Take 1 tablet by mouth daily       naproxen (NAPROSYN) 500 MG tablet Take 1 tablet (500 mg) by mouth 2 times daily (with meals) 60 tablet 3     propranolol (INDERAL) 20 MG tablet Take 1 tablet (20 mg) by mouth daily 90 tablet 3      rizatriptan (MAXALT-MLT) 10 MG ODT Take 1 tablet (10 mg) by mouth as needed 10 tablet 3     triamcinolone (KENALOG) 0.1 % external cream Apply sparingly to affected area twice daily for 14 days. 45 g 1     vitamin B complex with vitamin C (VITAMIN  B COMPLEX) tablet Take 1 tablet by mouth daily       Vitamin D-Vitamin K (K2 PLUS D3 PO) Take by mouth daily       Allergies   Allergen Reactions     Topiramate      Other reaction(s): Dizziness         Past medical history, past surgical history, current medications and allergies reviewed and accurate to the best of my knowledge.        OBJECTIVE:     /62   Pulse 73   Temp 98  F (36.7  C) (Tympanic)   Resp 16   Wt 89.4 kg (197 lb)   LMP  (LMP Unknown)   SpO2 98%   BMI 29.73 kg/m    Body mass index is 29.73 kg/m .     Physical Exam  General Appearance: Well appearing adult female, non ill appearance, appropriate appearance for age. No acute distress  Respiratory: normal chest wall and respirations.  Normal effort.  Clear to auscultation bilaterally, no wheezing, crackles or rhonchi.  No increased work of breathing.  No cough appreciated.  Cardiac: RRR with no murmurs  Abdomen: soft, nontender, no rigidity, no rebound tenderness or guarding, normal bowel sounds present  :  No suprapubic tenderness to palpation.  No CVA tenderness to palpation.    Musculoskeletal:  Equal movement of bilateral upper extremities.  Equal movement of bilateral lower extremities.  Normal gait.    Psychological: normal affect, alert, oriented, and pleasant.       Labs:  Results for orders placed or performed in visit on 12/17/21   UA reflex to Microscopic and Culture     Status: Abnormal    Specimen: Urine, Midstream   Result Value Ref Range    Color Urine Yellow Colorless, Straw, Light Yellow, Yellow    Appearance Urine Slightly Cloudy (A) Clear    Glucose Urine Negative Negative mg/dL    Bilirubin Urine Negative Negative    Ketones Urine Negative Negative mg/dL    Specific  Gravity Urine 1.018 1.000 - 1.030    Blood Urine Negative Negative    pH Urine 7.5 5.0 - 9.0    Protein Albumin Urine Negative Negative mg/dL    Urobilinogen Urine Normal Normal, 2.0 mg/dL    Nitrite Urine Negative Negative    Leukocyte Esterase Urine Negative Negative    RBC Urine <1 <=2 /HPF    WBC Urine 1 <=5 /HPF    Narrative    Urine Culture not indicated

## 2021-12-17 NOTE — NURSING NOTE
"Chief Complaint   Patient presents with     Urinary Problem     Patient is here for urinary frequency and burning that started Monday.     Initial /62   Pulse 73   Temp 98  F (36.7  C) (Tympanic)   Resp 16   Wt 89.4 kg (197 lb)   LMP  (LMP Unknown)   SpO2 98%   BMI 29.73 kg/m   Estimated body mass index is 29.73 kg/m  as calculated from the following:    Height as of 8/26/21: 1.734 m (5' 8.25\").    Weight as of this encounter: 89.4 kg (197 lb).  Medication Reconciliation: complete    Dulce Patricio LPN  "

## 2021-12-19 LAB — BACTERIA UR CULT: NO GROWTH

## 2022-04-12 DIAGNOSIS — G43.909 MIGRAINE WITHOUT STATUS MIGRAINOSUS, NOT INTRACTABLE, UNSPECIFIED MIGRAINE TYPE: ICD-10-CM

## 2022-04-13 NOTE — TELEPHONE ENCOUNTER
"     Disp Refills Start End SONJA   rizatriptan (MAXALT-MLT) 10 MG ODT 10 tablet 3 7/16/2021  No   Sig - Route: Take 1 tablet (10 mg) by mouth as needed - Oral   Sent to pharmacy as: Rizatriptan Benzoate 10 MG Oral Tablet Disintegrating (MAXALT-MLT)   Class: E-Prescribe     Last Office Visit: 08/26/2021  Future Office visit:         Requested Prescriptions   Pending Prescriptions Disp Refills     rizatriptan (MAXALT-MLT) 10 MG ODT [Pharmacy Med Name: rizatriptan 10 mg disintegrating tablet] 10 tablet 3     Sig: Take 1 tablet (10 mg) by mouth as needed       Serotonin Agonists Failed - 4/12/2022  9:04 AM        Failed - Serotonin Agonist request needs review.     Please review patient's record. If patient has had 8 or more treatments in the past month, please forward to provider.          Passed - Blood pressure under 140/90 in past 12 months     BP Readings from Last 3 Encounters:   12/17/21 104/62   08/26/21 104/72   08/10/21 120/72                 Passed - Recent (12 mo) or future (30 days) visit within the authorizing provider's specialty     Patient has had an office visit with the authorizing provider or a provider within the authorizing providers department within the previous 12 mos or has a future within next 30 days. See \"Patient Info\" tab in inbasket, or \"Choose Columns\" in Meds & Orders section of the refill encounter.              Passed - Medication is active on med list        Passed - Patient is age 18 or older        Passed - No active pregnancy on record        Passed - No positive pregnancy test in past 12 months             Routing refill request to provider for review/approval.    Unable to complete prescription refill per RNMedication Refill Policy.................... Marbella Chang RN ....................  4/13/2022   11:26 AM          "

## 2022-04-14 RX ORDER — RIZATRIPTAN BENZOATE 10 MG/1
10 TABLET, ORALLY DISINTEGRATING ORAL PRN
Qty: 10 TABLET | Refills: 3 | Status: SHIPPED | OUTPATIENT
Start: 2022-04-14 | End: 2022-08-29

## 2022-06-13 ENCOUNTER — LAB REQUISITION (OUTPATIENT)
Dept: LAB | Facility: OTHER | Age: 62
End: 2022-06-13

## 2022-06-13 DIAGNOSIS — Z11.52 ENCOUNTER FOR SCREENING FOR COVID-19: ICD-10-CM

## 2022-06-13 LAB
FLUAV RNA SPEC QL NAA+PROBE: NEGATIVE
FLUBV RNA RESP QL NAA+PROBE: NEGATIVE
RSV RNA SPEC NAA+PROBE: NEGATIVE
SARS-COV-2 RNA RESP QL NAA+PROBE: POSITIVE

## 2022-06-13 PROCEDURE — 87637 SARSCOV2&INF A&B&RSV AMP PRB: CPT | Performed by: FAMILY MEDICINE

## 2022-08-03 ENCOUNTER — HOSPITAL ENCOUNTER (OUTPATIENT)
Dept: MAMMOGRAPHY | Facility: OTHER | Age: 62
Discharge: HOME | End: 2022-08-03
Attending: FAMILY MEDICINE | Admitting: FAMILY MEDICINE
Payer: COMMERCIAL

## 2022-08-03 DIAGNOSIS — Z12.31 VISIT FOR SCREENING MAMMOGRAM: ICD-10-CM

## 2022-08-03 PROCEDURE — 77067 SCR MAMMO BI INCL CAD: CPT

## 2022-08-29 ENCOUNTER — OFFICE VISIT (OUTPATIENT)
Dept: FAMILY MEDICINE | Facility: OTHER | Age: 62
End: 2022-08-29
Attending: FAMILY MEDICINE
Payer: COMMERCIAL

## 2022-08-29 VITALS
OXYGEN SATURATION: 98 % | HEART RATE: 77 BPM | RESPIRATION RATE: 16 BRPM | BODY MASS INDEX: 31.43 KG/M2 | SYSTOLIC BLOOD PRESSURE: 120 MMHG | TEMPERATURE: 98 F | WEIGHT: 208.2 LBS | DIASTOLIC BLOOD PRESSURE: 74 MMHG

## 2022-08-29 DIAGNOSIS — L91.8 SKIN TAG: ICD-10-CM

## 2022-08-29 DIAGNOSIS — G43.909 MIGRAINE WITHOUT STATUS MIGRAINOSUS, NOT INTRACTABLE, UNSPECIFIED MIGRAINE TYPE: Primary | ICD-10-CM

## 2022-08-29 DIAGNOSIS — B35.1 ONYCHOMYCOSIS: ICD-10-CM

## 2022-08-29 DIAGNOSIS — B07.9 VIRAL WARTS, UNSPECIFIED TYPE: ICD-10-CM

## 2022-08-29 PROCEDURE — 17110 DESTRUCTION B9 LES UP TO 14: CPT | Performed by: FAMILY MEDICINE

## 2022-08-29 PROCEDURE — 99213 OFFICE O/P EST LOW 20 MIN: CPT | Mod: 25 | Performed by: FAMILY MEDICINE

## 2022-08-29 RX ORDER — PROPRANOLOL HYDROCHLORIDE 20 MG/1
20 TABLET ORAL DAILY
Qty: 90 TABLET | Refills: 3 | Status: SHIPPED | OUTPATIENT
Start: 2022-08-29 | End: 2023-08-30

## 2022-08-29 RX ORDER — RIZATRIPTAN BENZOATE 10 MG/1
10 TABLET, ORALLY DISINTEGRATING ORAL PRN
Qty: 10 TABLET | Refills: 11 | Status: SHIPPED | OUTPATIENT
Start: 2022-08-29 | End: 2023-09-25

## 2022-08-29 RX ORDER — METHYLDOPA/HYDROCHLOROTHIAZIDE 250MG-15MG
1 TABLET ORAL DAILY
COMMUNITY
Start: 2022-06-01

## 2022-08-29 ASSESSMENT — PAIN SCALES - GENERAL: PAINLEVEL: NO PAIN (0)

## 2022-08-29 NOTE — NURSING NOTE
Chief Complaint   Patient presents with     Recheck Medication     Here today for med check and also has concerns with toenails.     Medication Reconciliation: complete    Arleen Brown LPN

## 2022-08-29 NOTE — PROGRESS NOTES
Nursing Notes:   Arleen Brown LPN  8/29/2022  3:51 PM  Sign at exiting of workspace  Chief Complaint   Patient presents with     Recheck Medication     Here today for med check and also has concerns with toenails.     Medication Reconciliation: complete    Arleen Brown LPN      Subjective   Nicole Kerr is a 61 year old, presenting for the following health issues:  Recheck Medication    She needs refill of her medications that she uses for migraines.    2 years ago, she had taken lamisil for her toenail fungus.  It did get much better, but still has some thickening of some toenails.    She has a skin tag on her right upper inner arm that gets caught on her fingernails and clothing.  She would like to have it removed.    She also has a wart on her left thumb that she would like to have frozen.    History of Present Illness       Reason for visit:  Yearly Medication Review    She eats 2-3 servings of fruits and vegetables daily.She consumes 0 sweetened beverage(s) daily.She exercises with enough effort to increase her heart rate 20 to 29 minutes per day.  She exercises with enough effort to increase her heart rate 3 or less days per week.   She is taking medications regularly.       Med Check       Review of Systems   Constitutional, HEENT, cardiovascular, pulmonary, GI, , musculoskeletal, neuro, skin, endocrine and psych systems are negative, except as otherwise noted.      Objective    /74 (BP Location: Right arm, Patient Position: Sitting, Cuff Size: Adult Regular)   Pulse 77   Temp 98  F (36.7  C) (Tympanic)   Resp 16   Wt 94.4 kg (208 lb 3.2 oz)   LMP  (LMP Unknown)   SpO2 98%   Breastfeeding No   BMI 31.43 kg/m    Body mass index is 31.43 kg/m .  Physical Exam  Constitutional:       Appearance: Normal appearance.   HENT:      Head: Normocephalic.   Eyes:      Extraocular Movements: Extraocular movements intact.      Pupils: Pupils are equal, round, and reactive to light.   Cardiovascular:       Rate and Rhythm: Normal rate and regular rhythm.      Heart sounds: Normal heart sounds. No murmur heard.  Pulmonary:      Effort: Pulmonary effort is normal.      Breath sounds: Normal breath sounds. No wheezing, rhonchi or rales.   Musculoskeletal:      Cervical back: Normal range of motion and neck supple.   Lymphadenopathy:      Cervical: No cervical adenopathy.   Skin:     Comments: On her right upper inner arm there is a pedunculated skin tag.  This was frozen x 3 with liquid nitrogen.  She tolerated this well.  There is also a small wart on the pad of her left thumb.  This was also frozen x 3 with liquid nitrogen.  She again tolerated this well.    Mild thickening of some toenails.  There is some peeling of skin of her toes as well, consistent with tinea pedis.   Neurological:      Mental Status: She is alert.   Psychiatric:         Mood and Affect: Mood normal.         Behavior: Behavior normal.          Assessment & Plan   Nicole Perera is a 61 year old, presenting for the following health issues:      ICD-10-CM    1. Migraine without status migrainosus, not intractable, unspecified migraine type  G43.909 propranolol (INDERAL) 20 MG tablet     rizatriptan (MAXALT-MLT) 10 MG ODT   2. Onychomycosis  B35.1    3. Skin tag  L91.8 DESTRUCT BENIGN LESION, UP TO 14   4. Viral warts, unspecified type  B07.9 DESTRUCT BENIGN LESION, UP TO 14     1.  Propranolol and maxalt refilled.  She has tried stopping propranolol and gets more headaches again when she does.  2.  Discussed that another round of oral lamisil is not guaranteed to get rid of the onychomycosis, but that she could certainly try if she wishes.  Discussed that it also appears that she has some athlete's foot and that she could treat that with topical over the counter lamisil cream.  Discussed that reinfection happens relatively easily and that she could consider using antifungal powder or spray on feet in her shoes.  3.  Skin tag treated as above.   If it does not resolve, could consider snipping this off instead.  4.  Treated with cryotherapy as above.  Discussed other over the counter treatments that could be used to help this resolve.  If it does not completely go away, could retreat with cryotherapy again.        Encouraged weight loss and regular exercise.     No follow-ups on file.    Lillie Saldaña MD  [facility]            .  ..

## 2022-09-16 ENCOUNTER — HOSPITAL ENCOUNTER (OUTPATIENT)
Dept: GENERAL RADIOLOGY | Facility: OTHER | Age: 62
Discharge: HOME | End: 2022-09-16
Attending: NURSE PRACTITIONER
Payer: COMMERCIAL

## 2022-09-16 ENCOUNTER — OFFICE VISIT (OUTPATIENT)
Dept: FAMILY MEDICINE | Facility: OTHER | Age: 62
End: 2022-09-16
Attending: PHYSICIAN ASSISTANT
Payer: COMMERCIAL

## 2022-09-16 VITALS
BODY MASS INDEX: 31.18 KG/M2 | HEART RATE: 63 BPM | DIASTOLIC BLOOD PRESSURE: 76 MMHG | TEMPERATURE: 98.1 F | WEIGHT: 210.5 LBS | RESPIRATION RATE: 16 BRPM | HEIGHT: 69 IN | SYSTOLIC BLOOD PRESSURE: 118 MMHG | OXYGEN SATURATION: 100 %

## 2022-09-16 DIAGNOSIS — S96.911A STRAIN OF RIGHT FOOT, INITIAL ENCOUNTER: ICD-10-CM

## 2022-09-16 DIAGNOSIS — M79.671 RIGHT FOOT PAIN: Primary | ICD-10-CM

## 2022-09-16 PROCEDURE — 73630 X-RAY EXAM OF FOOT: CPT | Mod: RT

## 2022-09-16 PROCEDURE — 99213 OFFICE O/P EST LOW 20 MIN: CPT | Performed by: NURSE PRACTITIONER

## 2022-09-16 ASSESSMENT — PAIN SCALES - GENERAL: PAINLEVEL: MODERATE PAIN (5)

## 2022-09-16 NOTE — PROGRESS NOTES
ASSESSMENT/PLAN:     I have reviewed the nursing notes.  I have reviewed the findings, diagnosis, plan and need for follow up with the patient.        1. Right foot pain    - XR Foot Right G/E 3 Views  - Ankle/Foot Bracing Supplies Order for DME - ONLY FOR DME    2. Strain of right foot, initial encounter    - Ankle/Foot Bracing Supplies Order for DME - ONLY FOR DME    X-ray of right foot ordered and reviewed to rule out stress fracture, radiologist over read:   No evidence of acute or subacute bony abnormality.    Walking boot recommended due to significant pain with weightbearing and ambulating.  Weightbearing as tolerated in walking boot.  Recommend use of walking boot for the next week or 2 or until pain resolves.    Continue to ice and elevate frequently.  May use over-the-counter Tylenol or ibuprofen BID to TID  PRN    Discussed warning signs/symptoms indicative of need to f/u  Follow up if symptoms persist or worsen or concerns      I explained my diagnostic considerations and recommendations to the patient, who voiced understanding and agreement with the treatment plan. All questions were answered. We discussed potential side effects of any prescribed or recommended therapies, as well as expectations for response to treatments.    Kassidy Page NP  [facility]      SUBJECTIVE:   Nicole Perera is a 61 year old female who presents to clinic today for the following health issues:  Foot pain    HPI  Right foot pain and swelling for the past 8 days, persisting and now worsening.  Right foot pain and swelling initially started after she vigorously mowed her lawn with a push mower.  Yesterday worsened after walking on uneven ground for a few hours with heavy lifting and strenuous activities, increased pain up to 7-8/10.    No numbness or tingling.    Icing and elevating without relief  Taking Tylenol 650 and Ibuprofen 200 - 400 mg at bedtime, no medications during the daytime.      Past  Medical History:   Diagnosis Date     Abnormal cytological finding in specimen from cervix uteri     Hx of abnormal Pap with atypical squamous colp  showed benign reactive  epithelial changes, subsequent Paps have been normal.     Contact with and suspected exposure to communicable disease     Bat bite, left side of neck     Personal history of other medical treatment (CODE)          Personal history of other medical treatment (CODE)     HX of abnormal Pap with atypical squamous colp  showed benign reactive epithelial changes, subsequent Paps have been normal     Past Surgical History:   Procedure Laterality Date     APPENDECTOMY OPEN      No Comments Provided     BIOPSY BREAST      No Comments Provided      SECTION Right 1993    Rt breast biopsy for fibrocystic breast disease~     COLONOSCOPY  2010     normal  Next due .     ESOPHAGOSCOPY, GASTROSCOPY, DUODENOSCOPY (EGD), COMBINED  2007    Esophagogastroduodenoscopy     Social History     Tobacco Use     Smoking status: Never Smoker     Smokeless tobacco: Never Used   Substance Use Topics     Alcohol use: Yes     Alcohol/week: 1.0 standard drink     Comment: occasional 1/week     Current Outpatient Medications   Medication Sig Dispense Refill     aspirin EC 81 MG EC tablet Take 81 mg by mouth daily       calcium-vitamin D (CALTRATE) 600-400 MG-UNIT per tablet Take 1 tablet by mouth 2 times daily (with meals)       diphenhydrAMINE (BENADRYL) 25 MG capsule Take 25 mg by mouth every 6 hours as needed for itching or allergies       fish oil-omega-3 fatty acids 1000 MG capsule Take 1 capsule by mouth daily       MAGNESIUM OXIDE PO Take 400 mg by mouth       Menatetrenone (VITAMIN K2) 100 MCG TABS        Multiple Vitamin (MULTI-VITAMINS) TABS Take 1 tablet by mouth daily       propranolol (INDERAL) 20 MG tablet Take 1 tablet (20 mg) by mouth daily 90 tablet 3     rizatriptan (MAXALT-MLT) 10 MG ODT Take 1 tablet (10 mg) by mouth as  "needed for migraine 10 tablet 11     vitamin B complex with vitamin C (STRESS TAB) tablet Take 1 tablet by mouth daily       Vitamin D-Vitamin K (K2 PLUS D3 PO) Take by mouth daily       Allergies   Allergen Reactions     Topiramate      Other reaction(s): Dizziness         Past medical history, past surgical history, current medications and allergies reviewed and accurate to the best of my knowledge.        OBJECTIVE:     /76 (BP Location: Right arm, Patient Position: Sitting, Cuff Size: Adult Regular)   Pulse 63   Temp 98.1  F (36.7  C) (Tympanic)   Resp 16   Ht 1.753 m (5' 9\")   Wt 95.5 kg (210 lb 8 oz)   LMP  (LMP Unknown)   SpO2 100%   BMI 31.09 kg/m    Body mass index is 31.09 kg/m .     Physical Exam  General Appearance: Well appearing adult female, appropriate appearance for age. No acute distress  Respiratory: normal chest wall and respirations.  Normal effort.   No cough appreciated.  Cardiac: CMS intact to bilateral lower extremities with brisk capillary refill and palpable pedal pulses.  No calf pain or swelling.  Musculoskeletal: Right dorsal foot with generalized swelling and tenderness over the lateral portion along the fourth and fifth metatarsals.  Mild right ankle swelling without associated tenderness to palpation.  Able to wiggle right toes.  Range of motion to right ankle intact.  Limping gait.  Dermatological: Intact to right lower extremity without erythema, warmth, bruising, or openings of skin.  Psychological: normal affect, alert, oriented, and pleasant.       Imaging:  Results for orders placed or performed in visit on 09/16/22   XR Foot Right G/E 3 Views     Status: None    Narrative    Exam: XR FOOT RIGHT G/E 3 VIEWS     History:Female, age 61 years, Right foot pain    Comparison:  No relevant prior imaging.    Technique: Three views are submitted.    Findings: Bones are normally mineralized. No evidence of acute or  subacute fracture.  No evidence of dislocation.  Soft " tissues are  grossly unremarkable.           Impression    Impression:  No evidence of acute or subacute bony abnormality.    RIDGE DILL MD         SYSTEM ID:  [id number]

## 2022-09-16 NOTE — NURSING NOTE
Chief Complaint   Patient presents with     Musculoskeletal Problem     Right foot     Patient presents to the clinic today for right foot pain. Patient states she mowed the lawn quickly last Thursday and a few hours after her foot became swollen and painful. Patient states she has been taking tylenol and ibuprofen.     Medication Reconciliation: felicitas Murphy LPN

## 2022-09-17 ENCOUNTER — HEALTH MAINTENANCE LETTER (OUTPATIENT)
Age: 62
End: 2022-09-17

## 2023-01-28 ENCOUNTER — HEALTH MAINTENANCE LETTER (OUTPATIENT)
Age: 63
End: 2023-01-28

## 2023-07-20 ENCOUNTER — LAB (OUTPATIENT)
Dept: FAMILY MEDICINE | Facility: OTHER | Age: 63
End: 2023-07-20
Payer: COMMERCIAL

## 2023-07-20 DIAGNOSIS — Z12.11 SCREEN FOR COLON CANCER: ICD-10-CM

## 2023-08-07 DIAGNOSIS — Z12.11 SCREEN FOR COLON CANCER: Primary | ICD-10-CM

## 2023-08-07 DIAGNOSIS — R19.5 POSITIVE COLORECTAL CANCER SCREENING USING COLOGUARD TEST: ICD-10-CM

## 2023-08-07 LAB — NONINV COLON CA DNA+OCC BLD SCRN STL QL: POSITIVE

## 2023-08-08 DIAGNOSIS — Z12.11 ENCOUNTER FOR SCREENING COLONOSCOPY: Primary | ICD-10-CM

## 2023-08-08 RX ORDER — POLYETHYLENE GLYCOL 3350, SODIUM CHLORIDE, SODIUM BICARBONATE, POTASSIUM CHLORIDE 420; 11.2; 5.72; 1.48 G/4L; G/4L; G/4L; G/4L
4000 POWDER, FOR SOLUTION ORAL ONCE
Qty: 4000 ML | Refills: 0 | Status: SHIPPED | OUTPATIENT
Start: 2023-10-05 | End: 2023-10-05

## 2023-08-08 RX ORDER — BISACODYL 5 MG/1
TABLET, DELAYED RELEASE ORAL
Qty: 2 TABLET | Refills: 0 | Status: ON HOLD | OUTPATIENT
Start: 2023-10-05 | End: 2023-10-16

## 2023-08-08 NOTE — TELEPHONE ENCOUNTER
Screening Questions for the Scheduling of Screening Colonoscopies   (If Colonoscopy is diagnostic, Provider should review the chart before scheduling.)  Are you younger than 50 or older than 80?  NO  Do you take aspirin or fish oil?  ASPIRIN AND FISH OIL (if yes, tell patient to stop 1 week prior to Colonoscopy)  Do you take warfarin (Coumadin), clopidogrel (Plavix), apixaban (Eliquis), dabigatram (Pradaxa), rivaroxaban (Xarelto) or any blood thinner? NO  Do you use oxygen at home?  NO  Do you have kidney disease? NO  Are you on dialysis? NO  Have you had a stroke or heart attack in the last year? NO  Have you had a stent in your heart or any blood vessel in the last year? NO  Have you had a transplant of any organ? NO  Have you had a colonoscopy or upper endoscopy (EGD) before? YES         When?  2010  Date of scheduled Colonoscopy. 10/12/2023  Provider WIGGINS  [facility]arm[facility]

## 2023-08-15 ENCOUNTER — HOSPITAL ENCOUNTER (OUTPATIENT)
Dept: MAMMOGRAPHY | Facility: OTHER | Age: 63
Discharge: HOME | End: 2023-08-15
Attending: FAMILY MEDICINE | Admitting: FAMILY MEDICINE
Payer: COMMERCIAL

## 2023-08-15 DIAGNOSIS — Z12.31 VISIT FOR SCREENING MAMMOGRAM: ICD-10-CM

## 2023-08-15 PROCEDURE — 77067 SCR MAMMO BI INCL CAD: CPT

## 2023-08-24 DIAGNOSIS — G43.909 MIGRAINE WITHOUT STATUS MIGRAINOSUS, NOT INTRACTABLE, UNSPECIFIED MIGRAINE TYPE: ICD-10-CM

## 2023-08-30 RX ORDER — PROPRANOLOL HYDROCHLORIDE 20 MG/1
20 TABLET ORAL DAILY
Qty: 90 TABLET | Refills: 3 | Status: SHIPPED | OUTPATIENT
Start: 2023-08-30 | End: 2023-09-25

## 2023-09-18 ASSESSMENT — ENCOUNTER SYMPTOMS
HEARTBURN: 0
DIARRHEA: 0
SORE THROAT: 0
HEMATOCHEZIA: 0
EYE PAIN: 0
DYSURIA: 0
FREQUENCY: 1
NAUSEA: 0
CHILLS: 0
WEAKNESS: 0
DIZZINESS: 0
HEADACHES: 0
SHORTNESS OF BREATH: 0
MYALGIAS: 0
JOINT SWELLING: 0
PALPITATIONS: 0
BREAST MASS: 0
COUGH: 0
CONSTIPATION: 0
NERVOUS/ANXIOUS: 0
HEMATURIA: 0
ARTHRALGIAS: 0
ABDOMINAL PAIN: 0
FEVER: 0
PARESTHESIAS: 0

## 2023-09-25 ENCOUNTER — OFFICE VISIT (OUTPATIENT)
Dept: FAMILY MEDICINE | Facility: OTHER | Age: 63
End: 2023-09-25
Attending: FAMILY MEDICINE
Payer: COMMERCIAL

## 2023-09-25 VITALS
TEMPERATURE: 97.9 F | HEART RATE: 86 BPM | DIASTOLIC BLOOD PRESSURE: 80 MMHG | WEIGHT: 233.2 LBS | OXYGEN SATURATION: 97 % | RESPIRATION RATE: 16 BRPM | BODY MASS INDEX: 35.34 KG/M2 | SYSTOLIC BLOOD PRESSURE: 132 MMHG | HEIGHT: 68 IN

## 2023-09-25 DIAGNOSIS — Z00.00 HEALTH CARE MAINTENANCE: Primary | ICD-10-CM

## 2023-09-25 DIAGNOSIS — Z12.4 SCREENING FOR CERVICAL CANCER: ICD-10-CM

## 2023-09-25 DIAGNOSIS — R19.5 POSITIVE COLORECTAL CANCER SCREENING USING COLOGUARD TEST: ICD-10-CM

## 2023-09-25 DIAGNOSIS — G43.909 MIGRAINE WITHOUT STATUS MIGRAINOSUS, NOT INTRACTABLE, UNSPECIFIED MIGRAINE TYPE: ICD-10-CM

## 2023-09-25 DIAGNOSIS — I83.92 VARICOSE VEINS OF LEFT LOWER EXTREMITY, UNSPECIFIED WHETHER COMPLICATED: ICD-10-CM

## 2023-09-25 PROCEDURE — 87624 HPV HI-RISK TYP POOLED RSLT: CPT | Mod: ZL | Performed by: FAMILY MEDICINE

## 2023-09-25 PROCEDURE — G0123 SCREEN CERV/VAG THIN LAYER: HCPCS | Performed by: FAMILY MEDICINE

## 2023-09-25 PROCEDURE — 99396 PREV VISIT EST AGE 40-64: CPT | Performed by: FAMILY MEDICINE

## 2023-09-25 RX ORDER — PROPRANOLOL HYDROCHLORIDE 20 MG/1
20 TABLET ORAL DAILY
Qty: 90 TABLET | Refills: 3 | Status: SHIPPED | OUTPATIENT
Start: 2023-09-25

## 2023-09-25 RX ORDER — RIZATRIPTAN BENZOATE 10 MG/1
10 TABLET, ORALLY DISINTEGRATING ORAL PRN
Qty: 10 TABLET | Refills: 11 | Status: SHIPPED | OUTPATIENT
Start: 2023-09-25

## 2023-09-25 ASSESSMENT — ENCOUNTER SYMPTOMS
CHILLS: 0
EYE PAIN: 0
PARESTHESIAS: 0
PALPITATIONS: 0
WEAKNESS: 0
SORE THROAT: 0
COUGH: 0
HEMATURIA: 0
NERVOUS/ANXIOUS: 0
FEVER: 0
BREAST MASS: 0
DIARRHEA: 0
SHORTNESS OF BREATH: 0
ARTHRALGIAS: 0
MYALGIAS: 0
DIZZINESS: 0
ABDOMINAL PAIN: 0
HEARTBURN: 0
DYSURIA: 0
HEADACHES: 0
FREQUENCY: 1
HEMATOCHEZIA: 0
CONSTIPATION: 0
JOINT SWELLING: 0
NAUSEA: 0

## 2023-09-25 ASSESSMENT — PAIN SCALES - GENERAL: PAINLEVEL: NO PAIN (0)

## 2023-09-25 NOTE — NURSING NOTE
Chief Complaint   Patient presents with    Physical         Medication Reconciliation: complete    Arleen Brown, LPN

## 2023-09-25 NOTE — PROGRESS NOTES
SUBJECTIVE:   CC: Nicole Kerr is an 62 year old who presents for preventive health visit.       9/25/2023     8:56 AM   Additional Questions   Roomed by Arleen Brown     Has had a pain about a month ago on her lateral left knee.  She has some varicose veins in this area and wondered when she should do something about them.  She works from home now and is wearing compression leggings most every day.    She had a positive cologuard test on 7/31/23.  She is scheduled for a colonoscopy on 10/12/23 with Dr. Berman at [facility].    Healthy Habits:     Getting at least 3 servings of Calcium per day:  Yes    Bi-annual eye exam:  Yes    Dental care twice a year:  Yes    Sleep apnea or symptoms of sleep apnea:  None    Diet:  Regular (no restrictions)    Frequency of exercise:  6-7 days/week    Duration of exercise:  30-45 minutes    Taking medications regularly:  Yes    Medication side effects:  Not applicable    Additional concerns today:  Yes      Today's PHQ-2 Score:       9/25/2023     8:54 AM   PHQ-2 ( 1999 [employer])   Q1: Little interest or pleasure in doing things 0   Q2: Feeling down, depressed or hopeless 0   PHQ-2 Score 0   Q1: Little interest or pleasure in doing things Not at all   Q2: Feeling down, depressed or hopeless Not at all   PHQ-2 Score 0         Social History     Tobacco Use    Smoking status: Never    Smokeless tobacco: Never   Substance Use Topics    Alcohol use: Yes     Alcohol/week: 1.0 standard drink of alcohol     Comment: occasional 1/week             9/18/2023     9:25 AM   Alcohol Use   Prescreen: >3 drinks/day or >7 drinks/week? No     Reviewed orders with patient.  Reviewed health maintenance and updated orders accordingly - Yes  BP Readings from Last 3 Encounters:   09/25/23 132/80   09/16/22 118/76   08/29/22 120/74    Wt Readings from Last 3 Encounters:   09/25/23 105.8 kg (233 lb 3.2 oz)   09/16/22 95.5 kg (210 lb 8 oz)   08/29/22 94.4 kg (208 lb 3.2 oz)                  Patient Active Problem  List   Diagnosis    Dyspareunia (CODE)    Fibrocystic breast disease    Migraine headache    Rosacea     Past Surgical History:   Procedure Laterality Date    APPENDECTOMY OPEN      No Comments Provided    BIOPSY BREAST      No Comments Provided     SECTION Right 1993    Rt breast biopsy for fibrocystic breast disease~    COLONOSCOPY  2010     normal  Next due .    ESOPHAGOSCOPY, GASTROSCOPY, DUODENOSCOPY (EGD), COMBINED  2007    Esophagogastroduodenoscopy       Social History     Tobacco Use    Smoking status: Never    Smokeless tobacco: Never   Substance Use Topics    Alcohol use: Yes     Alcohol/week: 1.0 standard drink of alcohol     Comment: occasional 1/week     Family History   Problem Relation Age of Onset    Heart Disease Father 50        Heart Disease    Other - See Comments Father         Stroke    Other - See Comments Maternal Aunt         Migraines    Thyroid Disease Mother         Thyroid Disease,Thyroid disorder -after being stepped on by a cow    Diabetes Mother         Diabetes    Diabetes Brother         Diabetes    Other - See Comments Sister         CP    Substance Abuse Brother 44        Alcohol/Drug, ETOHism-         Current Outpatient Medications   Medication Sig Dispense Refill    aspirin EC 81 MG EC tablet Take 81 mg by mouth daily      [START ON 10/5/2023] bisacodyl (DULCOLAX) 5 MG EC tablet Take as directed by colonoscopy prep instructions 2 tablet 0    calcium-vitamin D (CALTRATE) 600-400 MG-UNIT per tablet Take 1 tablet by mouth 2 times daily (with meals)      diphenhydrAMINE (BENADRYL) 25 MG capsule Take 25 mg by mouth every 6 hours as needed for itching or allergies      fish oil-omega-3 fatty acids 1000 MG capsule Take 1 capsule by mouth daily      MAGNESIUM OXIDE PO Take 400 mg by mouth      Menatetrenone (VITAMIN K2) 100 MCG TABS       Multiple Vitamin (MULTI-VITAMINS) TABS Take 1 tablet by mouth daily      [START ON 10/5/2023] polyethylene  glycol-electrolytes (NULYTELY) 420 g solution Take 4,000 mLs by mouth once for 1 dose 4000 mL 0    propranolol (INDERAL) 20 MG tablet Take 1 tablet (20 mg) by mouth daily 90 tablet 3    rizatriptan (MAXALT-MLT) 10 MG ODT Take 1 tablet (10 mg) by mouth as needed for migraine 10 tablet 11    vitamin B complex with vitamin C (STRESS TAB) tablet Take 1 tablet by mouth daily      Vitamin D-Vitamin K (K2 PLUS D3 PO) Take by mouth daily       Allergies   Allergen Reactions    Topiramate      Other reaction(s): Dizziness       Breast Cancer Screenin/18/2023     9:25 AM   Breast CA Risk Assessment (FHS-7)   Do you have a family history of breast, colon, or ovarian cancer? No / Unknown         Mammogram Screening: Recommended mammography every 1-2 years with patient discussion and risk factor consideration  Pertinent mammograms are reviewed under the imaging tab.    History of abnormal Pap smear: NO - age 30-65 PAP every 5 years with negative HPV co-testing recommended      Latest Ref Rng & Units 4/3/2018    11:17 AM   PAP / HPV   HPV 16 DNA NEG^Negative Negative    HPV 18 DNA NEG^Negative Negative    Other HR HPV NEG^Negative Negative      Reviewed and updated as needed this visit by clinical staff   Tobacco  Allergies  Meds              Reviewed and updated as needed this visit by Provider                 Past Medical History:   Diagnosis Date    Abnormal cytological finding in specimen from cervix uteri     Hx of abnormal Pap with atypical squamous colp  showed benign reactive  epithelial changes, subsequent Paps have been normal.    Contact with and suspected exposure to communicable disease     Bat bite, left side of neck    Personal history of other medical treatment (CODE)         Personal history of other medical treatment (CODE)     HX of abnormal Pap with atypical squamous colp  showed benign reactive epithelial changes, subsequent Paps have been normal      Past Surgical History:  "  Procedure Laterality Date    APPENDECTOMY OPEN      No Comments Provided    BIOPSY BREAST      No Comments Provided     SECTION Right     Rt breast biopsy for fibrocystic breast disease~    COLONOSCOPY  2010     normal  Next due .    ESOPHAGOSCOPY, GASTROSCOPY, DUODENOSCOPY (EGD), COMBINED  2007    Esophagogastroduodenoscopy       Review of Systems   Constitutional:  Negative for chills and fever.   HENT:  Negative for congestion, ear pain, hearing loss and sore throat.    Eyes:  Negative for pain and visual disturbance.   Respiratory:  Negative for cough and shortness of breath.    Cardiovascular:  Negative for chest pain, palpitations and peripheral edema.   Gastrointestinal:  Negative for abdominal pain, constipation, diarrhea, heartburn, hematochezia and nausea.   Breasts:  Negative for tenderness, breast mass and discharge.   Genitourinary:  Positive for frequency and urgency. Negative for dysuria, genital sores, hematuria, pelvic pain, vaginal bleeding and vaginal discharge.   Musculoskeletal:  Negative for arthralgias, joint swelling and myalgias.   Skin:  Negative for rash.   Neurological:  Negative for dizziness, weakness, headaches and paresthesias.   Psychiatric/Behavioral:  Negative for mood changes. The patient is not nervous/anxious.           OBJECTIVE:   /80   Pulse 86   Temp 97.9  F (36.6  C) (Tympanic)   Resp 16   Ht 1.734 m (5' 8.25\")   Wt 105.8 kg (233 lb 3.2 oz)   LMP  (LMP Unknown)   SpO2 97%   Breastfeeding No   BMI 35.20 kg/m    Physical Exam  Constitutional:       General: She is not in acute distress.     Appearance: She is well-developed.   HENT:      Head: Normocephalic.      Right Ear: Tympanic membrane and external ear normal.      Left Ear: Tympanic membrane and external ear normal.      Nose: Nose normal.      Mouth/Throat:      Pharynx: No oropharyngeal exudate.   Eyes:      General:         Right eye: No discharge.         Left eye: No " discharge.      Conjunctiva/sclera: Conjunctivae normal.      Pupils: Pupils are equal, round, and reactive to light.   Neck:      Thyroid: No thyromegaly.      Trachea: No tracheal deviation.   Cardiovascular:      Rate and Rhythm: Normal rate and regular rhythm.      Pulses: Normal pulses.      Heart sounds: Normal heart sounds, S1 normal and S2 normal. No murmur heard.     No friction rub. No gallop. No S3 or S4 sounds.   Pulmonary:      Effort: Pulmonary effort is normal. No respiratory distress.      Breath sounds: Normal breath sounds. No wheezing or rales.      Comments: Breast exam:  No masses palpable bilaterally.  No skin changes, tethering or axillary lymphadenopathy bilaterally.    Abdominal:      General: Bowel sounds are normal. There is no distension.      Palpations: Abdomen is soft. There is no mass.      Tenderness: There is no abdominal tenderness.   Genitourinary:     Comments: Pelvic Exam:  Vulva: No external lesions, normal hair distribution, no adenopathy  Vagina: Moist, pink, no abnormal discharge, well rugated, no lesions  Cervix: Pap smear obtained.  Cervix is parous, smooth, pink, no visible lesions  Uterus: Normal size, anteverted, non-tender, mobile  Ovaries: No mass, non-tender, mobile  Musculoskeletal:         General: Normal range of motion.      Cervical back: Neck supple.      Comments: Some varicose veins of her left lateral leg around the level of her knee and distal thigh as well as her upper calf.   Lymphadenopathy:      Cervical: No cervical adenopathy.   Skin:     General: Skin is warm and dry.      Findings: No rash.   Neurological:      Mental Status: She is alert and oriented to person, place, and time.      Motor: No abnormal muscle tone.      Deep Tendon Reflexes: Reflexes are normal and symmetric.   Psychiatric:         Thought Content: Thought content normal.         Judgment: Judgment normal.         ASSESSMENT/PLAN:       ICD-10-CM    1. Health care maintenance   Z00.00       2. Screening for cervical cancer  Z12.4 Pap Screen with HPV - recommended age 30 - 65 years      3. Positive colorectal cancer screening using Cologuard test  R19.5       4. Migraine without status migrainosus, not intractable, unspecified migraine type  G43.909 propranolol (INDERAL) 20 MG tablet     rizatriptan (MAXALT-MLT) 10 MG ODT      5. Varicose veins of left lower extremity, unspecified whether complicated  I83.92         1.  Mammogram was completed last on 8/15/2023.  DEXA scan last completed 2019 and was normal.  Cologuard was positive on 2023 and she has a colonoscopy scheduled on 10/12/2023.  Pap/HPV updated today.  Tdap is up-to-date, last completed 4/3/2018.  Shingrix is up-to-date.  She will be getting her flu booster through employee health.  Consider COVID booster this fall.  Declines HIV screening due to low risk.  2.  See #1.  3.  See #1.  4.  Refilled propranolol and Maxalt.  5.  Discussed symptoms of varicose veins that might lead a person to having an endovenous ablation for these.  She feels that her symptoms are not bad enough at this time.  Discussed continuation of wearing compression stockings or compression tights.    Patient has been advised of split billing requirements and indicates understanding: Yes      COUNSELING:  Reviewed preventive health counseling, as reflected in patient instructions       Regular exercise       Healthy diet/nutrition       Osteoporosis prevention/bone health       Colorectal Cancer Screening       HIV screeninx in teen years, 1x in adult years, and at intervals if high risk        She reports that she has never smoked. She has never used smokeless tobacco.            Lillie Saldaña MD  [facility] AND [facility]

## 2023-09-25 NOTE — H&P (VIEW-ONLY)
SUBJECTIVE:   CC: Nicole Kerr is an 62 year old who presents for preventive health visit.       9/25/2023     8:56 AM   Additional Questions   Roomed by Alreen Brown     Has had a pain about a month ago on her lateral left knee.  She has some varicose veins in this area and wondered when she should do something about them.  She works from home now and is wearing compression leggings most every day.    She had a positive cologuard test on 7/31/23.  She is scheduled for a colonoscopy on 10/12/23 with Dr. Berman at [facility].    Healthy Habits:     Getting at least 3 servings of Calcium per day:  Yes    Bi-annual eye exam:  Yes    Dental care twice a year:  Yes    Sleep apnea or symptoms of sleep apnea:  None    Diet:  Regular (no restrictions)    Frequency of exercise:  6-7 days/week    Duration of exercise:  30-45 minutes    Taking medications regularly:  Yes    Medication side effects:  Not applicable    Additional concerns today:  Yes      Today's PHQ-2 Score:       9/25/2023     8:54 AM   PHQ-2 ( 1999 Pfizer)   Q1: Little interest or pleasure in doing things 0   Q2: Feeling down, depressed or hopeless 0   PHQ-2 Score 0   Q1: Little interest or pleasure in doing things Not at all   Q2: Feeling down, depressed or hopeless Not at all   PHQ-2 Score 0         Social History     Tobacco Use    Smoking status: Never    Smokeless tobacco: Never   Substance Use Topics    Alcohol use: Yes     Alcohol/week: 1.0 standard drink of alcohol     Comment: occasional 1/week             9/18/2023     9:25 AM   Alcohol Use   Prescreen: >3 drinks/day or >7 drinks/week? No     Reviewed orders with patient.  Reviewed health maintenance and updated orders accordingly - Yes  BP Readings from Last 3 Encounters:   09/25/23 132/80   09/16/22 118/76   08/29/22 120/74    Wt Readings from Last 3 Encounters:   09/25/23 105.8 kg (233 lb 3.2 oz)   09/16/22 95.5 kg (210 lb 8 oz)   08/29/22 94.4 kg (208 lb 3.2 oz)                  Patient Active Problem  List   Diagnosis    Dyspareunia (CODE)    Fibrocystic breast disease    Migraine headache    Rosacea     Past Surgical History:   Procedure Laterality Date    APPENDECTOMY OPEN      No Comments Provided    BIOPSY BREAST      No Comments Provided     SECTION Right 1993    Rt breast biopsy for fibrocystic breast disease~    COLONOSCOPY  2010     normal  Next due .    ESOPHAGOSCOPY, GASTROSCOPY, DUODENOSCOPY (EGD), COMBINED  2007    Esophagogastroduodenoscopy       Social History     Tobacco Use    Smoking status: Never    Smokeless tobacco: Never   Substance Use Topics    Alcohol use: Yes     Alcohol/week: 1.0 standard drink of alcohol     Comment: occasional 1/week     Family History   Problem Relation Age of Onset    Heart Disease Father 50        Heart Disease    Other - See Comments Father         Stroke    Other - See Comments Maternal Aunt         Migraines    Thyroid Disease Mother         Thyroid Disease,Thyroid disorder -after being stepped on by a cow    Diabetes Mother         Diabetes    Diabetes Brother         Diabetes    Other - See Comments Sister         CP    Substance Abuse Brother 44        Alcohol/Drug, ETOHism-         Current Outpatient Medications   Medication Sig Dispense Refill    aspirin EC 81 MG EC tablet Take 81 mg by mouth daily      [START ON 10/5/2023] bisacodyl (DULCOLAX) 5 MG EC tablet Take as directed by colonoscopy prep instructions 2 tablet 0    calcium-vitamin D (CALTRATE) 600-400 MG-UNIT per tablet Take 1 tablet by mouth 2 times daily (with meals)      diphenhydrAMINE (BENADRYL) 25 MG capsule Take 25 mg by mouth every 6 hours as needed for itching or allergies      fish oil-omega-3 fatty acids 1000 MG capsule Take 1 capsule by mouth daily      MAGNESIUM OXIDE PO Take 400 mg by mouth      Menatetrenone (VITAMIN K2) 100 MCG TABS       Multiple Vitamin (MULTI-VITAMINS) TABS Take 1 tablet by mouth daily      [START ON 10/5/2023] polyethylene  glycol-electrolytes (NULYTELY) 420 g solution Take 4,000 mLs by mouth once for 1 dose 4000 mL 0    propranolol (INDERAL) 20 MG tablet Take 1 tablet (20 mg) by mouth daily 90 tablet 3    rizatriptan (MAXALT-MLT) 10 MG ODT Take 1 tablet (10 mg) by mouth as needed for migraine 10 tablet 11    vitamin B complex with vitamin C (STRESS TAB) tablet Take 1 tablet by mouth daily      Vitamin D-Vitamin K (K2 PLUS D3 PO) Take by mouth daily       Allergies   Allergen Reactions    Topiramate      Other reaction(s): Dizziness       Breast Cancer Screenin/18/2023     9:25 AM   Breast CA Risk Assessment (FHS-7)   Do you have a family history of breast, colon, or ovarian cancer? No / Unknown         Mammogram Screening: Recommended mammography every 1-2 years with patient discussion and risk factor consideration  Pertinent mammograms are reviewed under the imaging tab.    History of abnormal Pap smear: NO - age 30-65 PAP every 5 years with negative HPV co-testing recommended      Latest Ref Rng & Units 4/3/2018    11:17 AM   PAP / HPV   HPV 16 DNA NEG^Negative Negative    HPV 18 DNA NEG^Negative Negative    Other HR HPV NEG^Negative Negative      Reviewed and updated as needed this visit by clinical staff   Tobacco  Allergies  Meds              Reviewed and updated as needed this visit by Provider                 Past Medical History:   Diagnosis Date    Abnormal cytological finding in specimen from cervix uteri     Hx of abnormal Pap with atypical squamous colp  showed benign reactive  epithelial changes, subsequent Paps have been normal.    Contact with and suspected exposure to communicable disease     Bat bite, left side of neck    Personal history of other medical treatment (CODE)         Personal history of other medical treatment (CODE)     HX of abnormal Pap with atypical squamous colp  showed benign reactive epithelial changes, subsequent Paps have been normal      Past Surgical History:  "  Procedure Laterality Date    APPENDECTOMY OPEN      No Comments Provided    BIOPSY BREAST      No Comments Provided     SECTION Right     Rt breast biopsy for fibrocystic breast disease~    COLONOSCOPY  2010     normal  Next due .    ESOPHAGOSCOPY, GASTROSCOPY, DUODENOSCOPY (EGD), COMBINED  2007    Esophagogastroduodenoscopy       Review of Systems   Constitutional:  Negative for chills and fever.   HENT:  Negative for congestion, ear pain, hearing loss and sore throat.    Eyes:  Negative for pain and visual disturbance.   Respiratory:  Negative for cough and shortness of breath.    Cardiovascular:  Negative for chest pain, palpitations and peripheral edema.   Gastrointestinal:  Negative for abdominal pain, constipation, diarrhea, heartburn, hematochezia and nausea.   Breasts:  Negative for tenderness, breast mass and discharge.   Genitourinary:  Positive for frequency and urgency. Negative for dysuria, genital sores, hematuria, pelvic pain, vaginal bleeding and vaginal discharge.   Musculoskeletal:  Negative for arthralgias, joint swelling and myalgias.   Skin:  Negative for rash.   Neurological:  Negative for dizziness, weakness, headaches and paresthesias.   Psychiatric/Behavioral:  Negative for mood changes. The patient is not nervous/anxious.           OBJECTIVE:   /80   Pulse 86   Temp 97.9  F (36.6  C) (Tympanic)   Resp 16   Ht 1.734 m (5' 8.25\")   Wt 105.8 kg (233 lb 3.2 oz)   LMP  (LMP Unknown)   SpO2 97%   Breastfeeding No   BMI 35.20 kg/m    Physical Exam  Constitutional:       General: She is not in acute distress.     Appearance: She is well-developed.   HENT:      Head: Normocephalic.      Right Ear: Tympanic membrane and external ear normal.      Left Ear: Tympanic membrane and external ear normal.      Nose: Nose normal.      Mouth/Throat:      Pharynx: No oropharyngeal exudate.   Eyes:      General:         Right eye: No discharge.         Left eye: No " discharge.      Conjunctiva/sclera: Conjunctivae normal.      Pupils: Pupils are equal, round, and reactive to light.   Neck:      Thyroid: No thyromegaly.      Trachea: No tracheal deviation.   Cardiovascular:      Rate and Rhythm: Normal rate and regular rhythm.      Pulses: Normal pulses.      Heart sounds: Normal heart sounds, S1 normal and S2 normal. No murmur heard.     No friction rub. No gallop. No S3 or S4 sounds.   Pulmonary:      Effort: Pulmonary effort is normal. No respiratory distress.      Breath sounds: Normal breath sounds. No wheezing or rales.      Comments: Breast exam:  No masses palpable bilaterally.  No skin changes, tethering or axillary lymphadenopathy bilaterally.    Abdominal:      General: Bowel sounds are normal. There is no distension.      Palpations: Abdomen is soft. There is no mass.      Tenderness: There is no abdominal tenderness.   Genitourinary:     Comments: Pelvic Exam:  Vulva: No external lesions, normal hair distribution, no adenopathy  Vagina: Moist, pink, no abnormal discharge, well rugated, no lesions  Cervix: Pap smear obtained.  Cervix is parous, smooth, pink, no visible lesions  Uterus: Normal size, anteverted, non-tender, mobile  Ovaries: No mass, non-tender, mobile  Musculoskeletal:         General: Normal range of motion.      Cervical back: Neck supple.      Comments: Some varicose veins of her left lateral leg around the level of her knee and distal thigh as well as her upper calf.   Lymphadenopathy:      Cervical: No cervical adenopathy.   Skin:     General: Skin is warm and dry.      Findings: No rash.   Neurological:      Mental Status: She is alert and oriented to person, place, and time.      Motor: No abnormal muscle tone.      Deep Tendon Reflexes: Reflexes are normal and symmetric.   Psychiatric:         Thought Content: Thought content normal.         Judgment: Judgment normal.         ASSESSMENT/PLAN:       ICD-10-CM    1. Health care maintenance   Z00.00       2. Screening for cervical cancer  Z12.4 Pap Screen with HPV - recommended age 30 - 65 years      3. Positive colorectal cancer screening using Cologuard test  R19.5       4. Migraine without status migrainosus, not intractable, unspecified migraine type  G43.909 propranolol (INDERAL) 20 MG tablet     rizatriptan (MAXALT-MLT) 10 MG ODT      5. Varicose veins of left lower extremity, unspecified whether complicated  I83.92         1.  Mammogram was completed last on 8/15/2023.  DEXA scan last completed 2019 and was normal.  Cologuard was positive on 2023 and she has a colonoscopy scheduled on 10/12/2023.  Pap/HPV updated today.  Tdap is up-to-date, last completed 4/3/2018.  Shingrix is up-to-date.  She will be getting her flu booster through employee health.  Consider COVID booster this fall.  Declines HIV screening due to low risk.  2.  See #1.  3.  See #1.  4.  Refilled propranolol and Maxalt.  5.  Discussed symptoms of varicose veins that might lead a person to having an endovenous ablation for these.  She feels that her symptoms are not bad enough at this time.  Discussed continuation of wearing compression stockings or compression tights.    Patient has been advised of split billing requirements and indicates understanding: Yes      COUNSELING:  Reviewed preventive health counseling, as reflected in patient instructions       Regular exercise       Healthy diet/nutrition       Osteoporosis prevention/bone health       Colorectal Cancer Screening       HIV screeninx in teen years, 1x in adult years, and at intervals if high risk        She reports that she has never smoked. She has never used smokeless tobacco.            Lillie Saldaña MD  [facility] AND [facility]

## 2023-09-28 LAB
BKR LAB AP GYN ADEQUACY: NORMAL
BKR LAB AP GYN INTERPRETATION: NORMAL
BKR LAB AP HPV REFLEX: NORMAL
BKR LAB AP PREVIOUS ABNORMAL: NORMAL
HUMAN PAPILLOMA VIRUS 16 DNA: NEGATIVE
HUMAN PAPILLOMA VIRUS 18 DNA: NEGATIVE
HUMAN PAPILLOMA VIRUS FINAL DIAGNOSIS: NORMAL
HUMAN PAPILLOMA VIRUS OTHER HR: NEGATIVE
PATH REPORT.COMMENTS IMP SPEC: NORMAL
PATH REPORT.COMMENTS IMP SPEC: NORMAL
PATH REPORT.RELEVANT HX SPEC: NORMAL

## 2023-10-15 ENCOUNTER — ANESTHESIA EVENT (OUTPATIENT)
Dept: SURGERY | Facility: OTHER | Age: 63
End: 2023-10-15
Payer: COMMERCIAL

## 2023-10-16 ENCOUNTER — ANESTHESIA (OUTPATIENT)
Dept: SURGERY | Facility: OTHER | Age: 63
End: 2023-10-16
Payer: COMMERCIAL

## 2023-10-16 ENCOUNTER — HOSPITAL ENCOUNTER (OUTPATIENT)
Facility: OTHER | Age: 63
Discharge: HOME | End: 2023-10-16
Attending: SURGERY | Admitting: SURGERY
Payer: COMMERCIAL

## 2023-10-16 VITALS
HEIGHT: 68 IN | DIASTOLIC BLOOD PRESSURE: 75 MMHG | OXYGEN SATURATION: 98 % | BODY MASS INDEX: 35.31 KG/M2 | SYSTOLIC BLOOD PRESSURE: 117 MMHG | RESPIRATION RATE: 16 BRPM | TEMPERATURE: 96.5 F | WEIGHT: 233 LBS | HEART RATE: 69 BPM

## 2023-10-16 PROCEDURE — 258N000003 HC RX IP 258 OP 636: Performed by: SURGERY

## 2023-10-16 PROCEDURE — 999N000010 HC STATISTIC ANES STAT CODE-CRNA PER MINUTE: Performed by: SURGERY

## 2023-10-16 PROCEDURE — 45385 COLONOSCOPY W/LESION REMOVAL: CPT | Performed by: NURSE ANESTHETIST, CERTIFIED REGISTERED

## 2023-10-16 PROCEDURE — 45385 COLONOSCOPY W/LESION REMOVAL: CPT | Mod: PT | Performed by: SURGERY

## 2023-10-16 PROCEDURE — 250N000011 HC RX IP 250 OP 636: Performed by: NURSE ANESTHETIST, CERTIFIED REGISTERED

## 2023-10-16 PROCEDURE — 45380 COLONOSCOPY AND BIOPSY: CPT | Mod: PT,XU

## 2023-10-16 PROCEDURE — 45380 COLONOSCOPY AND BIOPSY: CPT | Mod: PT | Performed by: SURGERY

## 2023-10-16 PROCEDURE — 45378 DIAGNOSTIC COLONOSCOPY: CPT | Performed by: SURGERY

## 2023-10-16 PROCEDURE — 250N000009 HC RX 250: Performed by: NURSE ANESTHETIST, CERTIFIED REGISTERED

## 2023-10-16 PROCEDURE — 88305 TISSUE EXAM BY PATHOLOGIST: CPT

## 2023-10-16 RX ORDER — LIDOCAINE 40 MG/G
CREAM TOPICAL
Status: DISCONTINUED | OUTPATIENT
Start: 2023-10-16 | End: 2023-10-16 | Stop reason: HOSPADM

## 2023-10-16 RX ORDER — FLUMAZENIL 0.1 MG/ML
0.2 INJECTION, SOLUTION INTRAVENOUS
Status: DISCONTINUED | OUTPATIENT
Start: 2023-10-16 | End: 2023-10-16 | Stop reason: HOSPADM

## 2023-10-16 RX ORDER — KETOROLAC TROMETHAMINE 30 MG/ML
30 INJECTION, SOLUTION INTRAMUSCULAR; INTRAVENOUS EVERY 6 HOURS PRN
Status: DISCONTINUED | OUTPATIENT
Start: 2023-10-16 | End: 2023-10-16 | Stop reason: HOSPADM

## 2023-10-16 RX ORDER — NALOXONE HYDROCHLORIDE 0.4 MG/ML
0.4 INJECTION, SOLUTION INTRAMUSCULAR; INTRAVENOUS; SUBCUTANEOUS
Status: DISCONTINUED | OUTPATIENT
Start: 2023-10-16 | End: 2023-10-16 | Stop reason: HOSPADM

## 2023-10-16 RX ORDER — PROPOFOL 10 MG/ML
INJECTION, EMULSION INTRAVENOUS CONTINUOUS PRN
Status: DISCONTINUED | OUTPATIENT
Start: 2023-10-16 | End: 2023-10-16

## 2023-10-16 RX ORDER — PROPOFOL 10 MG/ML
INJECTION, EMULSION INTRAVENOUS PRN
Status: DISCONTINUED | OUTPATIENT
Start: 2023-10-16 | End: 2023-10-16

## 2023-10-16 RX ORDER — NALOXONE HYDROCHLORIDE 0.4 MG/ML
0.2 INJECTION, SOLUTION INTRAMUSCULAR; INTRAVENOUS; SUBCUTANEOUS
Status: DISCONTINUED | OUTPATIENT
Start: 2023-10-16 | End: 2023-10-16 | Stop reason: HOSPADM

## 2023-10-16 RX ORDER — FAMOTIDINE 20 MG
TABLET ORAL DAILY
COMMUNITY

## 2023-10-16 RX ORDER — LIDOCAINE HYDROCHLORIDE 20 MG/ML
INJECTION, SOLUTION INFILTRATION; PERINEURAL PRN
Status: DISCONTINUED | OUTPATIENT
Start: 2023-10-16 | End: 2023-10-16

## 2023-10-16 RX ORDER — SODIUM CHLORIDE, SODIUM LACTATE, POTASSIUM CHLORIDE, CALCIUM CHLORIDE 600; 310; 30; 20 MG/100ML; MG/100ML; MG/100ML; MG/100ML
INJECTION, SOLUTION INTRAVENOUS CONTINUOUS
Status: DISCONTINUED | OUTPATIENT
Start: 2023-10-16 | End: 2023-10-16 | Stop reason: HOSPADM

## 2023-10-16 RX ADMIN — SODIUM CHLORIDE, SODIUM LACTATE, POTASSIUM CHLORIDE, AND CALCIUM CHLORIDE: 600; 310; 30; 20 INJECTION, SOLUTION INTRAVENOUS at 07:58

## 2023-10-16 RX ADMIN — LIDOCAINE HYDROCHLORIDE 60 MG: 20 INJECTION, SOLUTION INFILTRATION; PERINEURAL at 08:02

## 2023-10-16 RX ADMIN — PROPOFOL 40 MG: 10 INJECTION, EMULSION INTRAVENOUS at 08:08

## 2023-10-16 RX ADMIN — PROPOFOL 70 MG: 10 INJECTION, EMULSION INTRAVENOUS at 08:02

## 2023-10-16 RX ADMIN — KETOROLAC TROMETHAMINE 30 MG: 30 INJECTION, SOLUTION INTRAMUSCULAR; INTRAVENOUS at 07:32

## 2023-10-16 RX ADMIN — PROPOFOL 140 MCG/KG/MIN: 10 INJECTION, EMULSION INTRAVENOUS at 08:02

## 2023-10-16 ASSESSMENT — ACTIVITIES OF DAILY LIVING (ADL)
ADLS_ACUITY_SCORE: 35
ADLS_ACUITY_SCORE: 35

## 2023-10-16 NOTE — INTERVAL H&P NOTE
"I have reviewed the surgical (or preoperative) H&P that is linked to this encounter, and examined the patient. There are no significant changes    Clinical Conditions Present on Arrival:  Clinically Significant Risk Factors Present on Admission                 # Drug Induced Platelet Defect: home medication list includes an antiplatelet medication  # Obesity: Estimated body mass index is 35.17 kg/m  as calculated from the following:    Height as of this encounter: 1.734 m (5' 8.25\").    Weight as of this encounter: 105.7 kg (233 lb).       "

## 2023-10-16 NOTE — ANESTHESIA POSTPROCEDURE EVALUATION
Patient: Nicole Perera    Procedure: Procedure(s):  Colonoscopy WITH POLYPECTOMY AND BIOPSIES       Anesthesia Type:  MAC    Note:  Disposition: Outpatient   Postop Pain Control: Uneventful            Sign Out: Well controlled pain   PONV: No   Neuro/Psych: Uneventful            Sign Out: Acceptable/Baseline neuro status   Airway/Respiratory: Uneventful            Sign Out: Acceptable/Baseline resp. status   CV/Hemodynamics: Uneventful            Sign Out: Acceptable CV status; No obvious hypovolemia; No obvious fluid overload   Other NRE: NONE   DID A NON-ROUTINE EVENT OCCUR? No       Last vitals:  Vitals Value Taken Time   /75 10/16/23 0915   Temp 96.5  F (35.8  C) 10/16/23 0840   Pulse 70 10/16/23 0908   Resp 16 10/16/23 0915   SpO2 98 % 10/16/23 0915   Vitals shown include unfiled device data.    Electronically Signed By: ROMULO Rm CRNA  October 16, 2023  12:33 PM

## 2023-10-16 NOTE — OR NURSING
Patient was discharged home with  via ambulatory.   Discharge instructions were reviewed with patient and . And they verbalized understanding.   Prescriptions: none    Patient had a quarter size drop of red blood in toilet.   Denies sweating, nausea, abd pain.   Advised to come back if continued heavy bleeding.

## 2023-10-16 NOTE — DISCHARGE INSTRUCTIONS
[facility]  Adult Discharge Orders & Instructions    ________________________________________________________________          For 12 hours after surgery  Get plenty of rest.  A responsible adult must stay with you for at least 12 hours after you leave the hospital.   You may feel lightheaded.  IF so, sit for a few minutes before standing.  Have someone help you get up.   You may have a slight fever. Call the doctor if your fever is over 101 F (38.3 C) (taken under the tongue) or lasts longer than 24 hours.  You may have a dry mouth, a sore throat, muscle aches or trouble sleeping.  These should go away after 24 hours.  Do not make important or legal decisions.  6.   Do not drive or use heavy equipment.  If you have weakness or tingling, don't drive or use heavy equipment until this feeling goes away.    To contact a doctor, call   [phone]_______________________

## 2023-10-16 NOTE — ANESTHESIA CARE TRANSFER NOTE
Patient: Nicole Perera    Procedure: Procedure(s):  Colonoscopy WITH POLYPECTOMY AND BIOPSIES       Diagnosis: Positive colorectal cancer screening using Cologuard test [R19.5]  Colon cancer screening [Z12.11]  Diagnosis Additional Information: No value filed.    Anesthesia Type:   MAC     Note:    Oropharynx: oropharynx clear of all foreign objects and spontaneously breathing  Level of Consciousness: awake and drowsy  Oxygen Supplementation: room air    Independent Airway: airway patency satisfactory and stable  Dentition: dentition unchanged  Vital Signs Stable: post-procedure vital signs reviewed and stable  Report to RN Given: handoff report given  Patient transferred to: Phase II    Handoff Report: Identifed the Patient, Identified the Reponsible Provider, Reviewed the pertinent medical history, Discussed the surgical course, Reviewed Intra-OP anesthesia mangement and issues during anesthesia, Set expectations for post-procedure period and Allowed opportunity for questions and acknowledgement of understanding      Vitals:  Vitals Value Taken Time   BP     Temp     Pulse     Resp     SpO2         Electronically Signed By: Raleigh Sommers  October 16, 2023  8:41 AM

## 2023-10-16 NOTE — OP NOTE
PROCEDURE NOTE    SURGEON:Aden Berman MD    PRE-OP DIAGNOSIS:  Screening Colonoscopy, positive Cologuard      POST-OP DIAGNOSIS: Colon polyps in transverse colon, sigmoid, irritation at the anorectal junction  PROCEDURE: Colonoscopy with cold snare and biopsy    SPECIMEN:      ID Type Source Tests Collected by Time Destination   1 : TRANSVERSE COLON POLYP Polyp Large Intestine, Colon, Transverse SURGICAL PATHOLOGY EXAM Aden Berman MD 10/16/2023  8:22 AM    2 : SIGMOID COLON POLYP Polyp Large Intestine, Colon, Sigmoid SURGICAL PATHOLOGY EXAM Aden Berman MD 10/16/2023  8:27 AM    3 : RECTUM BIOPSIES Biopsy Rectum SURGICAL PATHOLOGY EXAM Aden Berman MD 10/16/2023  8:31 AM        ANESTHESIA:  MAC Student Nurse Anesthetist: Raleigh Sommers CRNA Independent: Rose Amor APRN CRNA   Coverage requested due to BMI over 35    ESTIMATED BLOOD LOSS: none    COMPLICATIONS:  None    INDICATION FOR THE PROCEDURE: The patient is a 62 year old female. The patient presents with need for screening, following up a positive Cologuard. I explained to the patient the risks, benefits and alternatives to screening colonoscopy for evaluating for cancer or polyps. We discussed the risks including bleeding, perforation, potential inability to reach the cecum and the risks of sedation. The patient's questions were answered and the patient wished to proceed. Informed consent paperwork was completed.    PROCEDURE: The patient was taken to the endoscopy suite. Appropriate monitors were attached. The patient was placed in the left lateral decubitus position. Timeout was performed confirming the patient's identity and procedure to be performed.  After appropriate sedation was confirmed, digital rectal exam was performed.  There was normal tone and no gross abnormality was noted.  The lubricated colonoscope was introduced into the anus the colon was insufflated with air. The prep quality was excellent. Under direct  visualization the scope was advanced to the cecum. The mucosa of the colon was inspected while withdrawing the scope.  Near the hepatic flexure and the transverse colon a 4 mm polyp was removed cold snare.  In the sigmoid a 3 mm polyp was removed cold snare.  Evaluation of the rectum showed a patchy irritation near the anorectal junction.  We biopsied both the pale area as well as the hypervascular area. The scope was retroflexed in the rectum and the anorectal junction was inspected.  Hemorrhoids were noted.  The scope was returned to a neutral position and the colon was decompressed. The scope was removed. The patient tolerated the procedure with no immediately apparent complication. The patient was taken to recovery in stable condition.    FOLLOW UP: RECOMMEND high fiber diet, will call with pathology results.  5-year follow-up regardless due to positive Cologuard    Aden Berman MD on 10/16/2023 at 8:34 AM

## 2023-10-16 NOTE — ANESTHESIA PREPROCEDURE EVALUATION
Anesthesia Pre-Procedure Evaluation    Patient: Nicole Perera   MRN: [id number] : 1960        Procedure : Procedure(s):  Colonoscopy          Past Medical History:   Diagnosis Date    Abnormal cytological finding in specimen from cervix uteri     Hx of abnormal Pap with atypical squamous colp  showed benign reactive  epithelial changes, subsequent Paps have been normal.    Contact with and suspected exposure to communicable disease     Bat bite, left side of neck    Personal history of other medical treatment (CODE)         Personal history of other medical treatment (CODE)     HX of abnormal Pap with atypical squamous colp  showed benign reactive epithelial changes, subsequent Paps have been normal      Past Surgical History:   Procedure Laterality Date    APPENDECTOMY OPEN      No Comments Provided    BIOPSY BREAST      No Comments Provided     SECTION Right 1993    Rt breast biopsy for fibrocystic breast disease~    COLONOSCOPY  2010     normal  Next due .    ESOPHAGOSCOPY, GASTROSCOPY, DUODENOSCOPY (EGD), COMBINED  2007    Esophagogastroduodenoscopy      Allergies   Allergen Reactions    Topiramate      Other reaction(s): Dizziness      Social History     Tobacco Use    Smoking status: Never    Smokeless tobacco: Never   Substance Use Topics    Alcohol use: Yes     Alcohol/week: 1.0 standard drink of alcohol     Comment: occasional 1/week      Wt Readings from Last 1 Encounters:   10/16/23 105.7 kg (233 lb)        Anesthesia Evaluation   Pt has had prior anesthetic.     No history of anesthetic complications       ROS/MED HX  ENT/Pulmonary:  - neg pulmonary ROS     Neurologic:     (+)      migraines,                          Cardiovascular:  - neg cardiovascular ROS     METS/Exercise Tolerance: >4 METS    Hematologic:  - neg hematologic  ROS     Musculoskeletal:  - neg musculoskeletal ROS     GI/Hepatic:     (+) GERD,                   Renal/Genitourinary:  - neg  "Renal ROS     Endo:  - neg endo ROS     Psychiatric/Substance Use:  - neg psychiatric ROS     Infectious Disease:  - neg infectious disease ROS     Malignancy:  - neg malignancy ROS     Other:  - neg other ROS          Physical Exam    Airway        Mallampati: I   TM distance: > 3 FB   Neck ROM: full   Mouth opening: > 3 cm    Respiratory Devices and Support         Dental       (+) Minor Abnormalities - some fillings, tiny chips      Cardiovascular   cardiovascular exam normal       Rhythm and rate: regular and normal     Pulmonary   pulmonary exam normal        breath sounds clear to auscultation           OUTSIDE LABS:  CBC:   Lab Results   Component Value Date    WBC 5.2 05/13/2019    HGB 14.5 05/13/2019    HGB 14.7 03/03/2017    HCT 43.1 05/13/2019    HCT 42.3 03/03/2017     05/13/2019     03/03/2017     BMP:   Lab Results   Component Value Date     08/25/2021     07/01/2020    POTASSIUM 4.2 08/25/2021    POTASSIUM 4.0 07/01/2020    CHLORIDE 105 08/25/2021    CHLORIDE 104 07/01/2020    CO2 28 08/25/2021    CO2 26 07/01/2020    BUN 21 08/25/2021    BUN 20 07/01/2020    CR 1.02 08/25/2021    CR 1.05 07/01/2020    GLC 97 08/25/2021     (H) 07/01/2020     COAGS: No results found for: \"PTT\", \"INR\", \"FIBR\"  POC: No results found for: \"BGM\", \"HCG\", \"HCGS\"  HEPATIC:   Lab Results   Component Value Date    ALBUMIN 4.4 08/25/2021    PROTTOTAL 6.7 08/25/2021    ALT 12 08/25/2021    AST 16 08/25/2021    ALKPHOS 58 08/25/2021    BILITOTAL 0.7 08/25/2021     OTHER:   Lab Results   Component Value Date    HÉCTOR 9.9 08/25/2021       Anesthesia Plan    ASA Status:  2    NPO Status:  NPO Appropriate    Anesthesia Type: MAC.   Induction: Propofol.   Maintenance: Balanced.        Consents    Anesthesia Plan(s) and associated risks, benefits, and realistic alternatives discussed. Questions answered and patient/representative(s) expressed understanding.     - Discussed: Risks, Benefits and " Alternatives for BOTH SEDATION and the PROCEDURE were discussed     - Discussed with:  Patient      - Extended Intubation/Ventilatory Support Discussed: No.      - Patient is DNR/DNI Status: No     Use of blood products discussed: No .     Postoperative Care    Pain management: Multi-modal analgesia.        Comments:    Other Comments: Preoperative ketorolac for migraine prevention.            ROMULO MARIA CRNA

## 2023-10-19 LAB
PATH REPORT.COMMENTS IMP SPEC: NORMAL
PATH REPORT.FINAL DX SPEC: NORMAL
PATH REPORT.RELEVANT HX SPEC: NORMAL
PHOTO IMAGE: NORMAL

## 2024-08-29 ENCOUNTER — HOSPITAL ENCOUNTER (OUTPATIENT)
Dept: MAMMOGRAPHY | Facility: OTHER | Age: 64
Discharge: HOME | End: 2024-08-29
Attending: FAMILY MEDICINE | Admitting: FAMILY MEDICINE
Payer: COMMERCIAL

## 2024-08-29 DIAGNOSIS — Z12.31 VISIT FOR SCREENING MAMMOGRAM: ICD-10-CM

## 2024-08-29 PROCEDURE — 77063 BREAST TOMOSYNTHESIS BI: CPT

## 2024-10-29 DIAGNOSIS — G43.909 MIGRAINE WITHOUT STATUS MIGRAINOSUS, NOT INTRACTABLE, UNSPECIFIED MIGRAINE TYPE: ICD-10-CM

## 2024-10-31 RX ORDER — RIZATRIPTAN BENZOATE 10 MG/1
TABLET, ORALLY DISINTEGRATING ORAL
Qty: 10 TABLET | Refills: 11 | Status: SHIPPED | OUTPATIENT
Start: 2024-10-31

## 2024-10-31 NOTE — TELEPHONE ENCOUNTER
[facility] sent Rx request for the following:      Requested Prescriptions   Pending Prescriptions Disp Refills    rizatriptan (MAXALT-MLT) 10 MG ODT [Pharmacy Med Name: rizatriptan 10 mg disintegrating tablet] 10 tablet 11     Sig: Take 1 tablet (10 mg) by mouth as needed for migraine. May repeat in 2 hours if needed. Max of 3 tablets (30 mg) per 24 hours.       Serotonin Agonists Failed - 10/31/2024  2:21 PM        Failed - Most recent blood pressure under 140/90 in past 12 months     BP Readings from Last 3 Encounters:   10/16/23 117/75   09/25/23 132/80   09/16/22 118/76       No data recorded            Failed - Serotonin Agonist request needs review.     If the patient has used less than or equal to nine (9) tablets or injections for migraine a month the RN may authorize the refill request.          Failed - Recent (12 mo) or future (90 days) visit within the authorizing provider's specialty     The patient must have completed an in-person or virtual visit within the past 12 months or has a future visit scheduled within the next 90 days with the authorizing provider s specialty.  Urgent care and e-visits do not quality as an office visit for this protocol.          Passed - Medication is active on med list        Passed - Medication indicated for associated diagnosis     Medication is associated with one or more of the following diagnoses:     Cluster headache   Headache   Migraine          Passed - Patient is age 18 or older        Passed - No active pregnancy on record        Passed - No positive pregnancy test in past 12 months             Last Prescription Date:   9/25/23  Last Fill Qty/Refills:         10, R-11    Last Office Visit:              9/25/23 (last discussed - [facility])   Future Office visit:             Next 5 appointments (look out 90 days)      Nov 08, 2024 1:00 PM  (Arrive by 12:45 PM)  Adult Preventative Visit with Lillie Saldaña MD  [facility]  [facility] ) [address]  [place] [address]  [phone]           Unable to complete prescription refill per RN Medication Refill Policy.     Damion Jung RN on 10/31/2024 at 2:22 PM

## 2024-11-04 SDOH — HEALTH STABILITY: PHYSICAL HEALTH: ON AVERAGE, HOW MANY MINUTES DO YOU ENGAGE IN EXERCISE AT THIS LEVEL?: 30 MIN

## 2024-11-04 SDOH — HEALTH STABILITY: PHYSICAL HEALTH: ON AVERAGE, HOW MANY DAYS PER WEEK DO YOU ENGAGE IN MODERATE TO STRENUOUS EXERCISE (LIKE A BRISK WALK)?: 2 DAYS

## 2024-11-04 ASSESSMENT — SOCIAL DETERMINANTS OF HEALTH (SDOH): HOW OFTEN DO YOU GET TOGETHER WITH FRIENDS OR RELATIVES?: TWICE A WEEK

## 2024-11-08 ENCOUNTER — OFFICE VISIT (OUTPATIENT)
Dept: FAMILY MEDICINE | Facility: OTHER | Age: 64
End: 2024-11-08
Attending: FAMILY MEDICINE
Payer: MEDICARE

## 2024-11-08 VITALS
SYSTOLIC BLOOD PRESSURE: 134 MMHG | TEMPERATURE: 97 F | WEIGHT: 240 LBS | OXYGEN SATURATION: 97 % | RESPIRATION RATE: 16 BRPM | HEART RATE: 71 BPM | HEIGHT: 68 IN | DIASTOLIC BLOOD PRESSURE: 86 MMHG | BODY MASS INDEX: 36.37 KG/M2

## 2024-11-08 DIAGNOSIS — Z78.0 POSTMENOPAUSE: ICD-10-CM

## 2024-11-08 DIAGNOSIS — Z00.00 ENCOUNTER FOR MEDICARE ANNUAL WELLNESS EXAM: Primary | ICD-10-CM

## 2024-11-08 DIAGNOSIS — Z23 NEED FOR COVID-19 VACCINE: ICD-10-CM

## 2024-11-08 DIAGNOSIS — G43.909 MIGRAINE WITHOUT STATUS MIGRAINOSUS, NOT INTRACTABLE, UNSPECIFIED MIGRAINE TYPE: ICD-10-CM

## 2024-11-08 PROCEDURE — G0402 INITIAL PREVENTIVE EXAM: HCPCS | Performed by: FAMILY MEDICINE

## 2024-11-08 PROCEDURE — 91320 SARSCV2 VAC 30MCG TRS-SUC IM: CPT

## 2024-11-08 PROCEDURE — G0463 HOSPITAL OUTPT CLINIC VISIT: HCPCS

## 2024-11-08 RX ORDER — PROPRANOLOL HCL 20 MG
20 TABLET ORAL DAILY
Qty: 90 TABLET | Refills: 3 | Status: SHIPPED | OUTPATIENT
Start: 2024-11-08

## 2024-11-08 RX ORDER — RIZATRIPTAN BENZOATE 10 MG/1
10 TABLET, ORALLY DISINTEGRATING ORAL
Qty: 10 TABLET | Refills: 11 | Status: CANCELLED | OUTPATIENT
Start: 2024-11-08

## 2024-11-08 ASSESSMENT — PAIN SCALES - GENERAL: PAINLEVEL_OUTOF10: NO PAIN (0)

## 2024-11-08 NOTE — NURSING NOTE
"Chief Complaint   Patient presents with    Medicare Visit     welcome       Initial /86   Pulse 71   Temp 97  F (36.1  C) (Temporal)   Resp 16   Ht 1.727 m (5' 8\")   Wt 108.9 kg (240 lb)   LMP  (LMP Unknown)   SpO2 97%   Breastfeeding No   BMI 36.49 kg/m   Estimated body mass index is 36.49 kg/m  as calculated from the following:    Height as of this encounter: 1.727 m (5' 8\").    Weight as of this encounter: 108.9 kg (240 lb).  Medication Review: complete    The next two questions are to help us understand your food security.  If you are feeling you need any assistance in this area, we have resources available to support you today.          11/4/2024   SDOH- Food Insecurity   Within the past 12 months, did you worry that your food would run out before you got money to buy more? N    Within the past 12 months, did the food you bought just not last and you didn t have money to get more? N        Patient-reported         Health Care Directive:  Patient does not have a Health Care Directive: Patient states has Advance Directive and will bring in a copy to clinic.    Honey Chandler LPN      "

## 2024-11-08 NOTE — PROGRESS NOTES
Preventive Care Visit  [facility] AND [facility]  Lillie Saldaña MD, Family Medicine  Nov 8, 2024          Subjective   Nicole Kerr is a 63 year old, presenting for the following:  Medicare Visit (welcome)        11/8/2024    12:51 PM   Additional Questions   Roomed by Honey Chandler LPN          HPI    Nicole Kerr is here today for her welcome to medicare wellness visit.      She needs a refill of inderal.  She has otherwise been doing well.      Annual Wellness Visit     Patient has been advised of split billing requirements and indicates understanding: Yes       Health Care Directive  Patient has a Health Care Directive on file  Advance care planning document is on file and is current.      11/4/2024   General Health   How would you rate your overall physical health? Good   Feel stress (tense, anxious, or unable to sleep) Not at all             11/4/2024   Nutrition   Diet: Regular (no restrictions)            11/4/2024   Exercise   Days per week of moderate/strenous exercise 2 days   Average minutes spent exercising at this level 30 min        (!) EXERCISE CONCERN      11/4/2024   Social Factors   Frequency of gathering with friends or relatives Twice a week   Worry food won't last until get money to buy more No   Food not last or not have enough money for food? No   Do you have housing? (Housing is defined as stable permanent housing and does not include staying ouside in a car, in a tent, in an abandoned building, in an overnight shelter, or couch-surfing.) Yes   Are you worried about losing your housing? No   Lack of transportation? No   Unable to get utilities (heat,electricity)? No              11/4/2024   Fall Risk   Fallen 2 or more times in the past year? No    Trouble with walking or balance? No        Patient-reported           No data to display                  11/4/2024   Dental   Dentist two times every year? Yes             No data to display                     No data to display                   11/4/2024   TB Screening   Were you born outside of the [place]? No          Social History     Tobacco Use    Smoking status: Never     Passive exposure: Never    Smokeless tobacco: Never   Vaping Use    Vaping status: Never Used   Substance Use Topics    Alcohol use: Yes     Alcohol/week: 1.0 standard drink of alcohol     Comment: occasional 1/week    Drug use: No     Comment: Drug use: No         Today's PHQ-2 Score:       11/8/2024    12:38 PM   PHQ-2 ( 1999 Pfizer)   Q1: Little interest or pleasure in doing things 0    Q2: Feeling down, depressed or hopeless 0    PHQ-2 Score 0    Q1: Little interest or pleasure in doing things Not at all   Q2: Feeling down, depressed or hopeless Not at all   PHQ-2 Score 0       Patient-reported           8/29/2024   LAST FHS-7 RESULTS   1st degree relative breast or ovarian cancer No   Any relative bilateral breast cancer No   Any male have breast cancer No   Any ONE woman have BOTH breast AND ovarian cancer No   Any woman with breast cancer before 50yrs No   2 or more relatives with breast AND/OR ovarian cancer No   2 or more relatives with breast AND/OR bowel cancer No           Mammogram Screening - Mammogram every 1-2 years updated in Health Maintenance based on mutual decision making        11/4/2024   STI Screening   New sexual partner(s) since last STI/HIV test? No        History of abnormal Pap smear: No - age 30- 64 PAP with HPV every 5 years recommended        Latest Ref Rng & Units 9/25/2023     9:20 AM 4/3/2018    11:17 AM   PAP / HPV   PAP  Negative for Intraepithelial Lesion or Malignancy (NILM)     HPV 16 DNA Negative Negative  Negative    HPV 18 DNA Negative Negative  Negative    Other HR HPV Negative Negative  Negative      ASCVD Risk   The 10-year ASCVD risk score (Cain CHANDLER, et al., 2019) is: 6.4%    Values used to calculate the score:      Age: 63 years      Sex: Female      Is Non- : No      Diabetic: No      Tobacco  smoker: No      Systolic Blood Pressure: 134 mmHg      Is BP treated: No      HDL Cholesterol: 44 mg/dL      Total Cholesterol: 220 mg/dL              Reviewed and updated as needed this visit by Provider                    Past Medical History:   Diagnosis Date    Abnormal cytological finding in specimen from cervix uteri     Hx of abnormal Pap with atypical squamous colp  showed benign reactive  epithelial changes, subsequent Paps have been normal.    Contact with and suspected exposure to communicable disease     Bat bite, left side of neck    Personal history of other medical treatment (CODE)         Personal history of other medical treatment (CODE)     HX of abnormal Pap with atypical squamous colp  showed benign reactive epithelial changes, subsequent Paps have been normal     Past Surgical History:   Procedure Laterality Date    APPENDECTOMY OPEN      No Comments Provided    BIOPSY BREAST      No Comments Provided     SECTION Right     Rt breast biopsy for fibrocystic breast disease~    COLONOSCOPY  2010     normal  Next due .    COLONOSCOPY N/A 10/16/2023    2 sessile serrated adenomas 5-7 yr follow up    ESOPHAGOSCOPY, GASTROSCOPY, DUODENOSCOPY (EGD), COMBINED  2007    Esophagogastroduodenoscopy     BP Readings from Last 3 Encounters:   24 134/86   10/16/23 117/75   23 132/80    Wt Readings from Last 3 Encounters:   24 108.9 kg (240 lb)   10/16/23 105.7 kg (233 lb)   23 105.8 kg (233 lb 3.2 oz)                  Patient Active Problem List   Diagnosis    Dyspareunia (CODE)    Fibrocystic breast disease    Migraine headache    Rosacea     Past Surgical History:   Procedure Laterality Date    APPENDECTOMY OPEN      No Comments Provided    BIOPSY BREAST      No Comments Provided     SECTION Right     Rt breast biopsy for fibrocystic breast disease~    COLONOSCOPY  2010     normal  Next due .    COLONOSCOPY N/A 10/16/2023     2 sessile serrated adenomas 5-7 yr follow up    ESOPHAGOSCOPY, GASTROSCOPY, DUODENOSCOPY (EGD), COMBINED  2007    Esophagogastroduodenoscopy       Social History     Tobacco Use    Smoking status: Never     Passive exposure: Never    Smokeless tobacco: Never   Substance Use Topics    Alcohol use: Yes     Alcohol/week: 1.0 standard drink of alcohol     Comment: occasional 1/week     Family History   Problem Relation Age of Onset    Heart Disease Father 50        Heart Disease    Other - See Comments Father         Stroke    Other - See Comments Maternal Aunt         Migraines    Thyroid Disease Mother         Thyroid Disease,Thyroid disorder -after being stepped on by a cow    Diabetes Mother         Diabetes    Diabetes Brother         Diabetes    Other - See Comments Sister         CP    Substance Abuse Brother 44        Alcohol/Drug, ETOHism-         Current Outpatient Medications   Medication Sig Dispense Refill    aspirin EC 81 MG EC tablet Take 81 mg by mouth daily      diphenhydrAMINE (BENADRYL) 25 MG capsule Take 25 mg by mouth nightly as needed for itching or allergies      fish oil-omega-3 fatty acids 1000 MG capsule Take 1 capsule by mouth daily      MAGNESIUM OXIDE PO Take 400 mg by mouth daily      Multiple Vitamin (MULTI-VITAMINS) TABS Take 1 tablet by mouth daily      propranolol (INDERAL) 20 MG tablet Take 1 tablet (20 mg) by mouth daily. 90 tablet 3    rizatriptan (MAXALT-MLT) 10 MG ODT Take 1 tablet (10 mg) by mouth as needed for migraine. May repeat in 2 hours if needed. Max of 3 tablets (30 mg) per 24 hours. 10 tablet 11    vitamin B complex with vitamin C (STRESS TAB) tablet Take 1 tablet by mouth daily      Vitamin D, Cholecalciferol, 25 MCG (1000 UT) CAPS Take by mouth daily       Allergies   Allergen Reactions    Topiramate      Other reaction(s): Dizziness       Current providers sharing in care for this patient include:  Patient Care Team:  Lillie Saldaña MD as PCP -  General (Family Practice)  Lillie Saldaña MD as Assigned PCP    The following health maintenance items are reviewed in Epic and correct as of today:  Health Maintenance   Topic Date Due    GLUCOSE  08/25/2024    MEDICARE ANNUAL WELLNESS VISIT  11/08/2025    LIPID  08/25/2026    MAMMO SCREENING  08/29/2026    DTAP/TDAP/TD IMMUNIZATION (4 - Td or Tdap) 04/03/2028    HPV TEST  09/25/2028    PAP  09/25/2028    COLORECTAL CANCER SCREENING  10/16/2028    ADVANCE CARE PLANNING  11/08/2029    RSV VACCINE (1 - 1-dose 75+ series) 11/16/2034    HEPATITIS C SCREENING  Completed    PHQ-2 (once per calendar year)  Completed    INFLUENZA VACCINE  Completed    ZOSTER IMMUNIZATION  Completed    COVID-19 Vaccine  Completed    Pneumococcal Vaccine: Pediatrics (0 to 5 Years) and At-Risk Patients (6 to 64 Years)  Aged Out    HPV IMMUNIZATION  Aged Out    MENINGITIS IMMUNIZATION  Aged Out    RSV MONOCLONAL ANTIBODY  Aged Out    HIV SCREENING  Discontinued       Appropriate preventive services were discussed with this patient, including applicable screening as appropriate for fall prevention, nutrition, physical activity, Tobacco-use cessation, weight loss and cognition.  Checklist reviewing preventive services available has been given to the patient.           11/8/2024   Mini Cog   Clock Draw Score 2 Normal   3 Item Recall 3 objects recalled   Mini Cog Total Score 5            Vision Screen  Patient wears corrective lenses (select all that apply): Worn during vision screen      Health Care Directive  Patient has a Health Care Directive on file  Advance care planning document is on file and is current.      11/4/2024   General Health   How would you rate your overall physical health? Good   Feel stress (tense, anxious, or unable to sleep) Not at all            11/4/2024   Nutrition   Three or more servings of calcium each day? Yes   Diet: Regular (no restrictions)   How many servings of fruit and vegetables per day? (!) 2-3    How many sweetened beverages each day? 0-1            11/4/2024   Exercise   Days per week of moderate/strenous exercise 2 days   Average minutes spent exercising at this level 30 min      (!) EXERCISE CONCERN      11/4/2024   Social Factors   Frequency of gathering with friends or relatives Twice a week   Worry food won't last until get money to buy more No   Food not last or not have enough money for food? No   Do you have housing? (Housing is defined as stable permanent housing and does not include staying ouside in a car, in a tent, in an abandoned building, in an overnight shelter, or couch-surfing.) Yes   Are you worried about losing your housing? No   Lack of transportation? No   Unable to get utilities (heat,electricity)? No            11/4/2024   Fall Risk   Fallen 2 or more times in the past year? No    Trouble with walking or balance? No        Patient-reported          11/4/2024   Dental   Dentist two times every year? Yes            11/4/2024   TB Screening   Were you born outside of the [place]? No            Today's PHQ-2 Score:       11/8/2024    12:38 PM   PHQ-2 ( 1999 Pfizer)   Q1: Little interest or pleasure in doing things 0    Q2: Feeling down, depressed or hopeless 0    PHQ-2 Score 0    Q1: Little interest or pleasure in doing things Not at all   Q2: Feeling down, depressed or hopeless Not at all   PHQ-2 Score 0       Patient-reported           11/4/2024   Substance Use   Alcohol more than 3/day or more than 7/wk No   Do you use any other substances recreationally? No        Social History     Tobacco Use    Smoking status: Never     Passive exposure: Never    Smokeless tobacco: Never   Vaping Use    Vaping status: Never Used   Substance Use Topics    Alcohol use: Yes     Alcohol/week: 1.0 standard drink of alcohol     Comment: occasional 1/week    Drug use: No     Comment: Drug use: No           8/29/2024   LAST FHS-7 RESULTS   1st degree relative breast or ovarian cancer No   Any relative  bilateral breast cancer No   Any male have breast cancer No   Any ONE woman have BOTH breast AND ovarian cancer No   Any woman with breast cancer before 50yrs No   2 or more relatives with breast AND/OR ovarian cancer No   2 or more relatives with breast AND/OR bowel cancer No           Mammogram Screening - Mammogram every 1-2 years updated in Health Maintenance based on mutual decision making        11/4/2024   STI Screening   New sexual partner(s) since last STI/HIV test? No        History of abnormal Pap smear: No - age 65 or older with adequate negative prior screening test results (3 consecutive negative cytology results, 2 consecutive negative cotesting results, or 2 consecutive negative HrHPV test results within 10 years, with the most recent test occurring within the recommended screening interval for the test used)        Latest Ref Rng & Units 9/25/2023     9:20 AM 4/3/2018    11:17 AM   PAP / HPV   PAP  Negative for Intraepithelial Lesion or Malignancy (NILM)     HPV 16 DNA Negative Negative  Negative    HPV 18 DNA Negative Negative  Negative    Other HR HPV Negative Negative  Negative      ASCVD Risk   The 10-year ASCVD risk score (Cain CHANDLER, et al., 2019) is: 6.4%    Values used to calculate the score:      Age: 63 years      Sex: Female      Is Non- : No      Diabetic: No      Tobacco smoker: No      Systolic Blood Pressure: 134 mmHg      Is BP treated: No      HDL Cholesterol: 44 mg/dL      Total Cholesterol: 220 mg/dL            Reviewed and updated as needed this visit by Provider                    Past Medical History:   Diagnosis Date    Abnormal cytological finding in specimen from cervix uteri     Hx of abnormal Pap with atypical squamous colp 1999 showed benign reactive  epithelial changes, subsequent Paps have been normal.    Contact with and suspected exposure to communicable disease     Bat bite, left side of neck    Personal history of other medical  treatment (CODE)         Personal history of other medical treatment (CODE)     HX of abnormal Pap with atypical squamous colp  showed benign reactive epithelial changes, subsequent Paps have been normal     Past Surgical History:   Procedure Laterality Date    APPENDECTOMY OPEN      No Comments Provided    BIOPSY BREAST      No Comments Provided     SECTION Right     Rt breast biopsy for fibrocystic breast disease~    COLONOSCOPY  2010     normal  Next due .    COLONOSCOPY N/A 10/16/2023    2 sessile serrated adenomas 5-7 yr follow up    ESOPHAGOSCOPY, GASTROSCOPY, DUODENOSCOPY (EGD), COMBINED  2007    Esophagogastroduodenoscopy     BP Readings from Last 3 Encounters:   24 134/86   10/16/23 117/75   23 132/80    Wt Readings from Last 3 Encounters:   24 108.9 kg (240 lb)   10/16/23 105.7 kg (233 lb)   23 105.8 kg (233 lb 3.2 oz)                  Patient Active Problem List   Diagnosis    Dyspareunia (CODE)    Fibrocystic breast disease    Migraine headache    Rosacea     Past Surgical History:   Procedure Laterality Date    APPENDECTOMY OPEN      No Comments Provided    BIOPSY BREAST      No Comments Provided     SECTION Right     Rt breast biopsy for fibrocystic breast disease~    COLONOSCOPY  2010     normal  Next due .    COLONOSCOPY N/A 10/16/2023    2 sessile serrated adenomas 5-7 yr follow up    ESOPHAGOSCOPY, GASTROSCOPY, DUODENOSCOPY (EGD), COMBINED  2007    Esophagogastroduodenoscopy       Social History     Tobacco Use    Smoking status: Never     Passive exposure: Never    Smokeless tobacco: Never   Substance Use Topics    Alcohol use: Yes     Alcohol/week: 1.0 standard drink of alcohol     Comment: occasional 1/week     Family History   Problem Relation Age of Onset    Heart Disease Father 50        Heart Disease    Other - See Comments Father         Stroke    Other - See Comments Maternal Aunt         Migraines     "Thyroid Disease Mother         Thyroid Disease,Thyroid disorder -after being stepped on by a cow    Diabetes Mother         Diabetes    Diabetes Brother         Diabetes    Other - See Comments Sister         CP    Substance Abuse Brother 44        Alcohol/Drug, ETOHism-         Current Outpatient Medications   Medication Sig Dispense Refill    aspirin EC 81 MG EC tablet Take 81 mg by mouth daily      diphenhydrAMINE (BENADRYL) 25 MG capsule Take 25 mg by mouth nightly as needed for itching or allergies      fish oil-omega-3 fatty acids 1000 MG capsule Take 1 capsule by mouth daily      MAGNESIUM OXIDE PO Take 400 mg by mouth daily      Multiple Vitamin (MULTI-VITAMINS) TABS Take 1 tablet by mouth daily      propranolol (INDERAL) 20 MG tablet Take 1 tablet (20 mg) by mouth daily. 90 tablet 3    rizatriptan (MAXALT-MLT) 10 MG ODT Take 1 tablet (10 mg) by mouth as needed for migraine. May repeat in 2 hours if needed. Max of 3 tablets (30 mg) per 24 hours. 10 tablet 11    vitamin B complex with vitamin C (STRESS TAB) tablet Take 1 tablet by mouth daily      Vitamin D, Cholecalciferol, 25 MCG (1000 UT) CAPS Take by mouth daily       Allergies   Allergen Reactions    Topiramate      Other reaction(s): Dizziness         Review of Systems  Constitutional, HEENT, cardiovascular, pulmonary, GI, , musculoskeletal, neuro, skin, endocrine and psych systems are negative, except as otherwise noted.     Objective    Exam  /86   Pulse 71   Temp 97  F (36.1  C) (Temporal)   Resp 16   Ht 1.727 m (5' 8\")   Wt 108.9 kg (240 lb)   LMP  (LMP Unknown)   SpO2 97%   Breastfeeding No   BMI 36.49 kg/m     Estimated body mass index is 36.49 kg/m  as calculated from the following:    Height as of this encounter: 1.727 m (5' 8\").    Weight as of this encounter: 108.9 kg (240 lb).    Physical Exam  Constitutional:       General: She is not in acute distress.     Appearance: She is well-developed.   HENT:      Head: " Normocephalic.      Right Ear: Tympanic membrane and external ear normal.      Left Ear: Tympanic membrane and external ear normal.      Nose: Nose normal.      Mouth/Throat:      Mouth: Mucous membranes are moist.      Pharynx: Oropharynx is clear. No oropharyngeal exudate or posterior oropharyngeal erythema.   Eyes:      General:         Right eye: No discharge.         Left eye: No discharge.      Conjunctiva/sclera: Conjunctivae normal.      Pupils: Pupils are equal, round, and reactive to light.   Neck:      Thyroid: No thyromegaly.      Trachea: No tracheal deviation.   Cardiovascular:      Rate and Rhythm: Normal rate and regular rhythm.      Pulses: Normal pulses.      Heart sounds: Normal heart sounds, S1 normal and S2 normal. No murmur heard.     No friction rub. No gallop. No S3 or S4 sounds.   Pulmonary:      Effort: Pulmonary effort is normal. No respiratory distress.      Breath sounds: Normal breath sounds. No wheezing or rales.      Comments: Breast exam:  No masses palpable bilaterally.  No skin changes, tethering or axillary lymphadenopathy bilaterally.    Abdominal:      General: Bowel sounds are normal. There is no distension.      Palpations: Abdomen is soft. There is no mass.      Tenderness: There is no abdominal tenderness.   Genitourinary:     Comments: Pelvic/Rectal exams deferred per patient.  Musculoskeletal:         General: Normal range of motion.      Cervical back: Neck supple.   Lymphadenopathy:      Cervical: No cervical adenopathy.   Skin:     General: Skin is warm and dry.      Findings: No rash.   Neurological:      Mental Status: She is alert and oriented to person, place, and time.      Motor: No abnormal muscle tone.      Deep Tendon Reflexes: Reflexes are normal and symmetric.   Psychiatric:         Mood and Affect: Mood normal.         Thought Content: Thought content normal.         Judgment: Judgment normal.             ICD-10-CM    1. Encounter for Medicare annual  wellness exam  Z00.00       2. Postmenopause  Z78.0 DX Bone Density      3. Need for COVID-19 vaccine  Z23 COVID-19 12+ (PFIZER)      4. Migraine without status migrainosus, not intractable, unspecified migraine type  G43.909 propranolol (INDERAL) 20 MG tablet           Mammogram is up-to-date, last completed 8/29/2024.  DEXA ordered.  Colonoscopy is up-to-date, last completed 10/12/2023 and serrated adenoma noted.  5 to 7-year follow-up was recommended.  Pap last completed 9/25/2023 and was normal.  Tdap, Shingrix, flu vaccines are all up-to-date.  COVID-vaccine updated today.  She will consider RSV and Prevnar at future visits when she is over the age of 65.  She declined any further labs today.  See #1.  See #1.  Stable.  Refills as above.    No follow-ups on file.           Lillie Saldaña MD

## 2024-11-08 NOTE — PATIENT INSTRUCTIONS
Patient Education   Preventive Care Advice   This is general advice given by our system to help you stay healthy. However, your care team may have specific advice just for you. Please talk to your care team about your preventive care needs.  Nutrition  Eat 5 or more servings of fruits and vegetables each day.  Try wheat bread, brown rice and whole grain pasta (instead of white bread, rice, and pasta).  Get enough calcium and vitamin D. Check the label on foods and aim for 100% of the RDA (recommended daily allowance).  Lifestyle  Exercise at least 150 minutes each week  (30 minutes a day, 5 days a week).  Do muscle strengthening activities 2 days a week. These help control your weight and prevent disease.  No smoking.  Wear sunscreen to prevent skin cancer.  Have a dental exam and cleaning every 6 months.  Yearly exams  See your health care team every year to talk about:  Any changes in your health.  Any medicines your care team has prescribed.  Preventive care, family planning, and ways to prevent chronic diseases.  Shots (vaccines)   HPV shots (up to age 26), if you've never had them before.  Hepatitis B shots (up to age 59), if you've never had them before.  COVID-19 shot: Get this shot when it's due.  Flu shot: Get a flu shot every year.  Tetanus shot: Get a tetanus shot every 10 years.  Pneumococcal, hepatitis A, and RSV shots: Ask your care team if you need these based on your risk.  Shingles shot (for age 50 and up)  General health tests  Diabetes screening:  Starting at age 35, Get screened for diabetes at least every 3 years.  If you are younger than age 35, ask your care team if you should be screened for diabetes.  Cholesterol test: At age 39, start having a cholesterol test every 5 years, or more often if advised.  Bone density scan (DEXA): At age 50, ask your care team if you should have this scan for osteoporosis (brittle bones).  Hepatitis C: Get tested at least once in your life.  STIs (sexually  transmitted infections)  Before age 24: Ask your care team if you should be screened for STIs.  After age 24: Get screened for STIs if you're at risk. You are at risk for STIs (including HIV) if:  You are sexually active with more than one person.  You don't use condoms every time.  You or a partner was diagnosed with a sexually transmitted infection.  If you are at risk for HIV, ask about PrEP medicine to prevent HIV.  Get tested for HIV at least once in your life, whether you are at risk for HIV or not.  Cancer screening tests  Cervical cancer screening: If you have a cervix, begin getting regular cervical cancer screening tests starting at age 21.  Breast cancer scan (mammogram): If you've ever had breasts, begin having regular mammograms starting at age 40. This is a scan to check for breast cancer.  Colon cancer screening: It is important to start screening for colon cancer at age 45.  Have a colonoscopy test every 10 years (or more often if you're at risk) Or, ask your provider about stool tests like a FIT test every year or Cologuard test every 3 years.  To learn more about your testing options, visit:   .  For help making a decision, visit:   https://bit.ly/ca24712.  Prostate cancer screening test: If you have a prostate, ask your care team if a prostate cancer screening test (PSA) at age 55 is right for you.  Lung cancer screening: If you are a current or former smoker ages 50 to 80, ask your care team if ongoing lung cancer screenings are right for you.  For informational purposes only. Not to replace the advice of your health care provider. Copyright   2023 [employer]. All rights reserved. Clinically reviewed by the [facility] Transitions Program. [employer] [id number] - REV 01/24.

## 2025-03-04 ENCOUNTER — HOSPITAL ENCOUNTER (OUTPATIENT)
Dept: BONE DENSITY | Facility: OTHER | Age: 65
Discharge: HOME | End: 2025-03-04
Attending: FAMILY MEDICINE
Payer: MEDICARE

## 2025-03-04 DIAGNOSIS — Z78.0 POSTMENOPAUSE: ICD-10-CM

## 2025-03-04 PROCEDURE — 77080 DXA BONE DENSITY AXIAL: CPT

## (undated) DEVICE — SOL WATER 1500ML

## (undated) DEVICE — SUCTION MANIFOLD NEPTUNE 2 SYS 4 PORT 0702-020-000

## (undated) DEVICE — ENDO SNARE EXACTO COLD 9MM LOOP 2.4MMX230CM 00711115

## (undated) DEVICE — ENDO BRUSH CHANNEL MASTER CLEANING 2-4.2MM BW-412T

## (undated) DEVICE — ENDO FORCEP ENDOJAW BIOPSY 2.8MMX230CM FB-220U

## (undated) DEVICE — ENDO TRAP POLYP E-TRAP 00711099

## (undated) DEVICE — TUBING SUCTION 10'X3/16" N510

## (undated) DEVICE — ENDO KIT COMPLIANCE DYKENDOCMPLY

## (undated) RX ORDER — PROPOFOL 10 MG/ML
INJECTION, EMULSION INTRAVENOUS
Status: DISPENSED
Start: 2023-10-16

## (undated) RX ORDER — GINSENG 100 MG
CAPSULE ORAL
Status: DISPENSED
Start: 2018-05-15

## (undated) RX ORDER — KETOROLAC TROMETHAMINE 30 MG/ML
INJECTION, SOLUTION INTRAMUSCULAR; INTRAVENOUS
Status: DISPENSED
Start: 2023-10-16

## (undated) RX ORDER — IBUPROFEN 200 MG
TABLET ORAL
Status: DISPENSED
Start: 2018-05-15